# Patient Record
Sex: FEMALE | Race: BLACK OR AFRICAN AMERICAN | NOT HISPANIC OR LATINO | Employment: UNEMPLOYED | URBAN - METROPOLITAN AREA
[De-identification: names, ages, dates, MRNs, and addresses within clinical notes are randomized per-mention and may not be internally consistent; named-entity substitution may affect disease eponyms.]

---

## 2023-08-01 LAB
CHLAMYDIA TRACH., AMPLIFIED: NEGATIVE
N. GONORRHEA, AMPLIFIED: NEGATIVE
TRICHOMONAS VAGINALIS: NEGATIVE
URINE CULTURE: NORMAL

## 2023-10-05 PROBLEM — Z34.90 PREGNANCY (HHS-HCC): Status: ACTIVE | Noted: 2023-10-05

## 2023-10-05 PROBLEM — N92.6 MISSED MENSES: Status: ACTIVE | Noted: 2023-10-05

## 2023-10-05 RX ORDER — ONDANSETRON 4 MG/1
TABLET, FILM COATED ORAL
Status: ON HOLD | COMMUNITY
End: 2023-10-26 | Stop reason: ALTCHOICE

## 2023-10-05 RX ORDER — ALBUTEROL SULFATE 90 UG/1
AEROSOL, METERED RESPIRATORY (INHALATION)
COMMUNITY

## 2023-10-05 RX ORDER — LURASIDONE HYDROCHLORIDE 20 MG/1
TABLET, FILM COATED ORAL
Status: ON HOLD | COMMUNITY
End: 2023-10-26 | Stop reason: ALTCHOICE

## 2023-10-05 RX ORDER — DOCUSATE SODIUM 100 MG/1
100 CAPSULE, LIQUID FILLED ORAL NIGHTLY PRN
Status: ON HOLD | COMMUNITY
End: 2023-10-26 | Stop reason: ALTCHOICE

## 2023-10-25 ENCOUNTER — APPOINTMENT (OUTPATIENT)
Dept: OBSTETRICS AND GYNECOLOGY | Facility: HOSPITAL | Age: 28
End: 2023-10-25
Payer: COMMERCIAL

## 2023-10-26 ENCOUNTER — ANESTHESIA (OUTPATIENT)
Dept: OBSTETRICS AND GYNECOLOGY | Facility: HOSPITAL | Age: 28
End: 2023-10-26
Payer: COMMERCIAL

## 2023-10-26 ENCOUNTER — ANESTHESIA (OUTPATIENT)
Dept: ANESTHESIOLOGY | Facility: HOSPITAL | Age: 28
End: 2023-10-26
Payer: COMMERCIAL

## 2023-10-26 ENCOUNTER — HOSPITAL ENCOUNTER (INPATIENT)
Facility: HOSPITAL | Age: 28
LOS: 4 days | Discharge: HOME | End: 2023-10-30
Attending: STUDENT IN AN ORGANIZED HEALTH CARE EDUCATION/TRAINING PROGRAM | Admitting: STUDENT IN AN ORGANIZED HEALTH CARE EDUCATION/TRAINING PROGRAM
Payer: COMMERCIAL

## 2023-10-26 ENCOUNTER — ANESTHESIA EVENT (OUTPATIENT)
Dept: OBSTETRICS AND GYNECOLOGY | Facility: HOSPITAL | Age: 28
End: 2023-10-26
Payer: COMMERCIAL

## 2023-10-26 DIAGNOSIS — O14.13 SEVERE PRE-ECLAMPSIA IN THIRD TRIMESTER (HHS-HCC): Primary | ICD-10-CM

## 2023-10-26 PROBLEM — O16.3 ELEVATED BLOOD PRESSURE AFFECTING PREGNANCY IN THIRD TRIMESTER, ANTEPARTUM (HHS-HCC): Status: ACTIVE | Noted: 2023-10-26

## 2023-10-26 LAB
ABO GROUP (TYPE) IN BLOOD: NORMAL
ABO GROUP (TYPE) IN BLOOD: NORMAL
ALBUMIN SERPL BCP-MCNC: 2.9 G/DL (ref 3.4–5)
ALBUMIN SERPL BCP-MCNC: 2.9 G/DL (ref 3.4–5)
ALBUMIN SERPL BCP-MCNC: 3.3 G/DL (ref 3.4–5)
ALBUMIN SERPL BCP-MCNC: 3.3 G/DL (ref 3.4–5)
ALP SERPL-CCNC: 122 U/L (ref 33–110)
ALP SERPL-CCNC: 131 U/L (ref 33–110)
ALP SERPL-CCNC: 142 U/L (ref 33–110)
ALP SERPL-CCNC: 156 U/L (ref 33–110)
ALT SERPL W P-5'-P-CCNC: 109 U/L (ref 7–45)
ALT SERPL W P-5'-P-CCNC: 120 U/L (ref 7–45)
ALT SERPL W P-5'-P-CCNC: 124 U/L (ref 7–45)
ALT SERPL W P-5'-P-CCNC: 145 U/L (ref 7–45)
ANION GAP SERPL CALC-SCNC: 16 MMOL/L (ref 10–20)
ANION GAP SERPL CALC-SCNC: 18 MMOL/L (ref 10–20)
ANION GAP SERPL CALC-SCNC: 18 MMOL/L (ref 10–20)
ANION GAP SERPL CALC-SCNC: 20 MMOL/L (ref 10–20)
ANTIBODY SCREEN: NORMAL
ANTIBODY SCREEN: NORMAL
APTT PPP: 26 SECONDS (ref 27–38)
AST SERPL W P-5'-P-CCNC: 192 U/L (ref 9–39)
AST SERPL W P-5'-P-CCNC: 209 U/L (ref 9–39)
AST SERPL W P-5'-P-CCNC: 279 U/L (ref 9–39)
AST SERPL W P-5'-P-CCNC: 302 U/L (ref 9–39)
BILIRUB SERPL-MCNC: 0.7 MG/DL (ref 0–1.2)
BILIRUB SERPL-MCNC: 1.1 MG/DL (ref 0–1.2)
BILIRUB SERPL-MCNC: 1.2 MG/DL (ref 0–1.2)
BILIRUB SERPL-MCNC: 1.2 MG/DL (ref 0–1.2)
BUN SERPL-MCNC: 17 MG/DL (ref 6–23)
BUN SERPL-MCNC: 18 MG/DL (ref 6–23)
BUN SERPL-MCNC: 19 MG/DL (ref 6–23)
BUN SERPL-MCNC: 21 MG/DL (ref 6–23)
CALCIUM SERPL-MCNC: 8.2 MG/DL (ref 8.6–10.6)
CALCIUM SERPL-MCNC: 8.4 MG/DL (ref 8.6–10.6)
CALCIUM SERPL-MCNC: 9.2 MG/DL (ref 8.6–10.6)
CALCIUM SERPL-MCNC: 9.3 MG/DL (ref 8.6–10.6)
CHLORIDE SERPL-SCNC: 103 MMOL/L (ref 98–107)
CHLORIDE SERPL-SCNC: 103 MMOL/L (ref 98–107)
CHLORIDE SERPL-SCNC: 104 MMOL/L (ref 98–107)
CHLORIDE SERPL-SCNC: 105 MMOL/L (ref 98–107)
CO2 SERPL-SCNC: 17 MMOL/L (ref 21–32)
CO2 SERPL-SCNC: 18 MMOL/L (ref 21–32)
CO2 SERPL-SCNC: 18 MMOL/L (ref 21–32)
CO2 SERPL-SCNC: 21 MMOL/L (ref 21–32)
CREAT SERPL-MCNC: 1 MG/DL (ref 0.5–1.05)
CREAT SERPL-MCNC: 1.03 MG/DL (ref 0.5–1.05)
CREAT SERPL-MCNC: 1.1 MG/DL (ref 0.5–1.05)
CREAT SERPL-MCNC: 1.15 MG/DL (ref 0.5–1.05)
ERYTHROCYTE [DISTWIDTH] IN BLOOD BY AUTOMATED COUNT: 12 % (ref 11.5–14.5)
ERYTHROCYTE [DISTWIDTH] IN BLOOD BY AUTOMATED COUNT: 12.4 % (ref 11.5–14.5)
ERYTHROCYTE [DISTWIDTH] IN BLOOD BY AUTOMATED COUNT: 12.5 % (ref 11.5–14.5)
ERYTHROCYTE [DISTWIDTH] IN BLOOD BY AUTOMATED COUNT: 12.6 % (ref 11.5–14.5)
FIBRINOGEN PPP-MCNC: 531 MG/DL (ref 200–400)
GFR SERPL CREATININE-BSD FRML MDRD: 67 ML/MIN/1.73M*2
GFR SERPL CREATININE-BSD FRML MDRD: 71 ML/MIN/1.73M*2
GFR SERPL CREATININE-BSD FRML MDRD: 77 ML/MIN/1.73M*2
GFR SERPL CREATININE-BSD FRML MDRD: 79 ML/MIN/1.73M*2
GLUCOSE SERPL-MCNC: 129 MG/DL (ref 74–99)
GLUCOSE SERPL-MCNC: 156 MG/DL (ref 74–99)
GLUCOSE SERPL-MCNC: 83 MG/DL (ref 74–99)
GLUCOSE SERPL-MCNC: 90 MG/DL (ref 74–99)
HBV SURFACE AG SERPL QL IA: NONREACTIVE
HCT VFR BLD AUTO: 37.6 % (ref 36–46)
HCT VFR BLD AUTO: 39 % (ref 36–46)
HCT VFR BLD AUTO: 39.4 % (ref 36–46)
HCT VFR BLD AUTO: 42.9 % (ref 36–46)
HCV AB SER QL: NONREACTIVE
HGB BLD-MCNC: 12.4 G/DL (ref 12–16)
HGB BLD-MCNC: 12.5 G/DL (ref 12–16)
HGB BLD-MCNC: 13.1 G/DL (ref 12–16)
HGB BLD-MCNC: 14.5 G/DL (ref 12–16)
INR PPP: 0.9 (ref 0.9–1.1)
LDH SERPL L TO P-CCNC: 555 U/L (ref 84–246)
MCH RBC QN AUTO: 29.1 PG (ref 26–34)
MCH RBC QN AUTO: 29.3 PG (ref 26–34)
MCH RBC QN AUTO: 29.7 PG (ref 26–34)
MCH RBC QN AUTO: 29.8 PG (ref 26–34)
MCHC RBC AUTO-ENTMCNC: 31.8 G/DL (ref 32–36)
MCHC RBC AUTO-ENTMCNC: 33.2 G/DL (ref 32–36)
MCHC RBC AUTO-ENTMCNC: 33.2 G/DL (ref 32–36)
MCHC RBC AUTO-ENTMCNC: 33.8 G/DL (ref 32–36)
MCV RBC AUTO: 88 FL (ref 80–100)
MCV RBC AUTO: 94 FL (ref 80–100)
NRBC BLD-RTO: 0 /100 WBCS (ref 0–0)
PLATELET # BLD AUTO: 109 X10*3/UL (ref 150–450)
PLATELET # BLD AUTO: 118 X10*3/UL (ref 150–450)
PLATELET # BLD AUTO: 205 X10*3/UL (ref 150–450)
PLATELET # BLD AUTO: 91 X10*3/UL (ref 150–450)
PMV BLD AUTO: 10.7 FL (ref 7.5–11.5)
PMV BLD AUTO: 10.9 FL (ref 7.5–11.5)
PMV BLD AUTO: 11 FL (ref 7.5–11.5)
PMV BLD AUTO: 11.4 FL (ref 7.5–11.5)
POTASSIUM SERPL-SCNC: 4.4 MMOL/L (ref 3.5–5.3)
POTASSIUM SERPL-SCNC: 4.5 MMOL/L (ref 3.5–5.3)
POTASSIUM SERPL-SCNC: 4.7 MMOL/L (ref 3.5–5.3)
POTASSIUM SERPL-SCNC: 5 MMOL/L (ref 3.5–5.3)
PROT SERPL-MCNC: 5.7 G/DL (ref 6.4–8.2)
PROT SERPL-MCNC: 5.8 G/DL (ref 6.4–8.2)
PROT SERPL-MCNC: 6.4 G/DL (ref 6.4–8.2)
PROT SERPL-MCNC: 6.5 G/DL (ref 6.4–8.2)
PROTHROMBIN TIME: 9.7 SECONDS (ref 9.8–12.8)
RBC # BLD AUTO: 4.17 X10*6/UL (ref 4–5.2)
RBC # BLD AUTO: 4.29 X10*6/UL (ref 4–5.2)
RBC # BLD AUTO: 4.47 X10*6/UL (ref 4–5.2)
RBC # BLD AUTO: 4.86 X10*6/UL (ref 4–5.2)
RH FACTOR (ANTIGEN D): NORMAL
RH FACTOR (ANTIGEN D): NORMAL
RUBV IGG SERPL IA-ACNC: 2.3 IA
RUBV IGG SERPL QL IA: POSITIVE
SODIUM SERPL-SCNC: 132 MMOL/L (ref 136–145)
SODIUM SERPL-SCNC: 134 MMOL/L (ref 136–145)
SODIUM SERPL-SCNC: 137 MMOL/L (ref 136–145)
SODIUM SERPL-SCNC: 139 MMOL/L (ref 136–145)
T PALLIDUM AB SER QL: NONREACTIVE
WBC # BLD AUTO: 10.9 X10*3/UL (ref 4.4–11.3)
WBC # BLD AUTO: 16.5 X10*3/UL (ref 4.4–11.3)
WBC # BLD AUTO: 16.5 X10*3/UL (ref 4.4–11.3)
WBC # BLD AUTO: 18.5 X10*3/UL (ref 4.4–11.3)

## 2023-10-26 PROCEDURE — 31500 INSERT EMERGENCY AIRWAY: CPT | Performed by: STUDENT IN AN ORGANIZED HEALTH CARE EDUCATION/TRAINING PROGRAM

## 2023-10-26 PROCEDURE — 1100000001 HC PRIVATE ROOM DAILY

## 2023-10-26 PROCEDURE — 36415 COLL VENOUS BLD VENIPUNCTURE: CPT

## 2023-10-26 PROCEDURE — 2500000001 HC RX 250 WO HCPCS SELF ADMINISTERED DRUGS (ALT 637 FOR MEDICARE OP)

## 2023-10-26 PROCEDURE — 3700000014 HC AN EPIDURAL BLOCK CHARGE: Performed by: STUDENT IN AN ORGANIZED HEALTH CARE EDUCATION/TRAINING PROGRAM

## 2023-10-26 PROCEDURE — 86803 HEPATITIS C AB TEST: CPT | Performed by: STUDENT IN AN ORGANIZED HEALTH CARE EDUCATION/TRAINING PROGRAM

## 2023-10-26 PROCEDURE — 2500000004 HC RX 250 GENERAL PHARMACY W/ HCPCS (ALT 636 FOR OP/ED): Performed by: STUDENT IN AN ORGANIZED HEALTH CARE EDUCATION/TRAINING PROGRAM

## 2023-10-26 PROCEDURE — 86920 COMPATIBILITY TEST SPIN: CPT

## 2023-10-26 PROCEDURE — 86780 TREPONEMA PALLIDUM: CPT | Performed by: STUDENT IN AN ORGANIZED HEALTH CARE EDUCATION/TRAINING PROGRAM

## 2023-10-26 PROCEDURE — 86900 BLOOD TYPING SEROLOGIC ABO: CPT

## 2023-10-26 PROCEDURE — 7210000002 HC LABOR PER HOUR

## 2023-10-26 PROCEDURE — 96372 THER/PROPH/DIAG INJ SC/IM: CPT | Performed by: STUDENT IN AN ORGANIZED HEALTH CARE EDUCATION/TRAINING PROGRAM

## 2023-10-26 PROCEDURE — 85384 FIBRINOGEN ACTIVITY: CPT | Performed by: STUDENT IN AN ORGANIZED HEALTH CARE EDUCATION/TRAINING PROGRAM

## 2023-10-26 PROCEDURE — 36415 COLL VENOUS BLD VENIPUNCTURE: CPT | Performed by: STUDENT IN AN ORGANIZED HEALTH CARE EDUCATION/TRAINING PROGRAM

## 2023-10-26 PROCEDURE — 59514 CESAREAN DELIVERY ONLY: CPT | Performed by: STUDENT IN AN ORGANIZED HEALTH CARE EDUCATION/TRAINING PROGRAM

## 2023-10-26 PROCEDURE — 83615 LACTATE (LD) (LDH) ENZYME: CPT | Performed by: STUDENT IN AN ORGANIZED HEALTH CARE EDUCATION/TRAINING PROGRAM

## 2023-10-26 PROCEDURE — 86900 BLOOD TYPING SEROLOGIC ABO: CPT | Performed by: STUDENT IN AN ORGANIZED HEALTH CARE EDUCATION/TRAINING PROGRAM

## 2023-10-26 PROCEDURE — 84075 ASSAY ALKALINE PHOSPHATASE: CPT | Performed by: STUDENT IN AN ORGANIZED HEALTH CARE EDUCATION/TRAINING PROGRAM

## 2023-10-26 PROCEDURE — 7100000016 HC LABOR RECOVERY PER HOUR

## 2023-10-26 PROCEDURE — 2500000005 HC RX 250 GENERAL PHARMACY W/O HCPCS

## 2023-10-26 PROCEDURE — 88305 TISSUE EXAM BY PATHOLOGIST: CPT | Mod: TC,SUR | Performed by: STUDENT IN AN ORGANIZED HEALTH CARE EDUCATION/TRAINING PROGRAM

## 2023-10-26 PROCEDURE — 80053 COMPREHEN METABOLIC PANEL: CPT | Performed by: STUDENT IN AN ORGANIZED HEALTH CARE EDUCATION/TRAINING PROGRAM

## 2023-10-26 PROCEDURE — 88305 TISSUE EXAM BY PATHOLOGIST: CPT | Performed by: PATHOLOGY

## 2023-10-26 PROCEDURE — 85027 COMPLETE CBC AUTOMATED: CPT | Performed by: STUDENT IN AN ORGANIZED HEALTH CARE EDUCATION/TRAINING PROGRAM

## 2023-10-26 PROCEDURE — 85610 PROTHROMBIN TIME: CPT | Performed by: STUDENT IN AN ORGANIZED HEALTH CARE EDUCATION/TRAINING PROGRAM

## 2023-10-26 PROCEDURE — 86317 IMMUNOASSAY INFECTIOUS AGENT: CPT | Performed by: STUDENT IN AN ORGANIZED HEALTH CARE EDUCATION/TRAINING PROGRAM

## 2023-10-26 PROCEDURE — 99199 UNLISTED SPECIAL SVC PX/RPRT: CPT

## 2023-10-26 PROCEDURE — 87389 HIV-1 AG W/HIV-1&-2 AB AG IA: CPT | Performed by: STUDENT IN AN ORGANIZED HEALTH CARE EDUCATION/TRAINING PROGRAM

## 2023-10-26 PROCEDURE — 3700000018 HC OB ANESTHESIA C-SECTION: Performed by: STUDENT IN AN ORGANIZED HEALTH CARE EDUCATION/TRAINING PROGRAM

## 2023-10-26 PROCEDURE — 01961 ANES CESAREAN DELIVERY ONLY: CPT | Performed by: STUDENT IN AN ORGANIZED HEALTH CARE EDUCATION/TRAINING PROGRAM

## 2023-10-26 PROCEDURE — 7100000016 HC LABOR RECOVERY PER HOUR: Performed by: STUDENT IN AN ORGANIZED HEALTH CARE EDUCATION/TRAINING PROGRAM

## 2023-10-26 PROCEDURE — 01961 ANES CESAREAN DELIVERY ONLY: CPT

## 2023-10-26 PROCEDURE — 2500000004 HC RX 250 GENERAL PHARMACY W/ HCPCS (ALT 636 FOR OP/ED)

## 2023-10-26 PROCEDURE — 87340 HEPATITIS B SURFACE AG IA: CPT | Performed by: STUDENT IN AN ORGANIZED HEALTH CARE EDUCATION/TRAINING PROGRAM

## 2023-10-26 RX ORDER — HYDROMORPHONE HYDROCHLORIDE 1 MG/ML
0.2 INJECTION, SOLUTION INTRAMUSCULAR; INTRAVENOUS; SUBCUTANEOUS EVERY 5 MIN PRN
Status: DISCONTINUED | OUTPATIENT
Start: 2023-10-26 | End: 2023-10-31 | Stop reason: HOSPADM

## 2023-10-26 RX ORDER — TRANEXAMIC ACID 100 MG/ML
1000 INJECTION, SOLUTION INTRAVENOUS ONCE AS NEEDED
Status: DISCONTINUED | OUTPATIENT
Start: 2023-10-26 | End: 2023-10-31 | Stop reason: HOSPADM

## 2023-10-26 RX ORDER — CEFAZOLIN SODIUM 2 G/50ML
SOLUTION INTRAVENOUS AS NEEDED
Status: DISCONTINUED | OUTPATIENT
Start: 2023-10-26 | End: 2023-10-26

## 2023-10-26 RX ORDER — BETAMETHASONE SODIUM PHOSPHATE AND BETAMETHASONE ACETATE 3; 3 MG/ML; MG/ML
12 INJECTION, SUSPENSION INTRA-ARTICULAR; INTRALESIONAL; INTRAMUSCULAR; SOFT TISSUE EVERY 24 HOURS
Status: DISCONTINUED | OUTPATIENT
Start: 2023-10-26 | End: 2023-10-26

## 2023-10-26 RX ORDER — DIPHENHYDRAMINE HYDROCHLORIDE 50 MG/ML
25 INJECTION INTRAMUSCULAR; INTRAVENOUS EVERY 4 HOURS PRN
Status: DISCONTINUED | OUTPATIENT
Start: 2023-10-26 | End: 2023-10-31 | Stop reason: HOSPADM

## 2023-10-26 RX ORDER — HYDRALAZINE HYDROCHLORIDE 20 MG/ML
10 INJECTION INTRAMUSCULAR; INTRAVENOUS ONCE
Status: COMPLETED | OUTPATIENT
Start: 2023-10-26 | End: 2023-10-26

## 2023-10-26 RX ORDER — ONDANSETRON 4 MG/1
4 TABLET, FILM COATED ORAL EVERY 6 HOURS PRN
Status: DISCONTINUED | OUTPATIENT
Start: 2023-10-26 | End: 2023-10-31 | Stop reason: HOSPADM

## 2023-10-26 RX ORDER — LIDOCAINE HYDROCHLORIDE 20 MG/ML
INJECTION, SOLUTION EPIDURAL; INFILTRATION; INTRACAUDAL; PERINEURAL AS NEEDED
Status: DISCONTINUED | OUTPATIENT
Start: 2023-10-26 | End: 2023-10-26

## 2023-10-26 RX ORDER — NALBUPHINE HYDROCHLORIDE 10 MG/ML
5 INJECTION, SOLUTION INTRAMUSCULAR; INTRAVENOUS; SUBCUTANEOUS
Status: DISCONTINUED | OUTPATIENT
Start: 2023-10-26 | End: 2023-10-27

## 2023-10-26 RX ORDER — HYDRALAZINE HYDROCHLORIDE 20 MG/ML
INJECTION INTRAMUSCULAR; INTRAVENOUS
Status: COMPLETED
Start: 2023-10-26 | End: 2023-10-26

## 2023-10-26 RX ORDER — ONDANSETRON 4 MG/1
4 TABLET, FILM COATED ORAL EVERY 6 HOURS PRN
Status: DISCONTINUED | OUTPATIENT
Start: 2023-10-26 | End: 2023-10-26

## 2023-10-26 RX ORDER — OXYTOCIN/0.9 % SODIUM CHLORIDE 30/500 ML
60 PLASTIC BAG, INJECTION (ML) INTRAVENOUS ONCE AS NEEDED
Status: DISCONTINUED | OUTPATIENT
Start: 2023-10-26 | End: 2023-10-31 | Stop reason: HOSPADM

## 2023-10-26 RX ORDER — FAMOTIDINE 10 MG/ML
INJECTION INTRAVENOUS AS NEEDED
Status: DISCONTINUED | OUTPATIENT
Start: 2023-10-26 | End: 2023-10-26

## 2023-10-26 RX ORDER — OXYTOCIN/0.9 % SODIUM CHLORIDE 30/500 ML
60 PLASTIC BAG, INJECTION (ML) INTRAVENOUS ONCE AS NEEDED
Status: DISCONTINUED | OUTPATIENT
Start: 2023-10-26 | End: 2023-10-26

## 2023-10-26 RX ORDER — PROPOFOL 10 MG/ML
INJECTION, EMULSION INTRAVENOUS AS NEEDED
Status: DISCONTINUED | OUTPATIENT
Start: 2023-10-26 | End: 2023-10-26

## 2023-10-26 RX ORDER — DIPHENHYDRAMINE HCL 25 MG
25 CAPSULE ORAL EVERY 4 HOURS PRN
Status: DISCONTINUED | OUTPATIENT
Start: 2023-10-26 | End: 2023-10-31 | Stop reason: HOSPADM

## 2023-10-26 RX ORDER — NALOXONE HYDROCHLORIDE 0.4 MG/ML
0.1 INJECTION, SOLUTION INTRAMUSCULAR; INTRAVENOUS; SUBCUTANEOUS EVERY 5 MIN PRN
Status: DISCONTINUED | OUTPATIENT
Start: 2023-10-26 | End: 2023-10-31 | Stop reason: HOSPADM

## 2023-10-26 RX ORDER — LIDOCAINE HYDROCHLORIDE 10 MG/ML
30 INJECTION INFILTRATION; PERINEURAL ONCE AS NEEDED
Status: DISCONTINUED | OUTPATIENT
Start: 2023-10-26 | End: 2023-10-26

## 2023-10-26 RX ORDER — KETOROLAC TROMETHAMINE 30 MG/ML
INJECTION, SOLUTION INTRAMUSCULAR; INTRAVENOUS AS NEEDED
Status: DISCONTINUED | OUTPATIENT
Start: 2023-10-26 | End: 2023-10-26

## 2023-10-26 RX ORDER — METOCLOPRAMIDE 10 MG/1
10 TABLET ORAL EVERY 6 HOURS PRN
Status: DISCONTINUED | OUTPATIENT
Start: 2023-10-26 | End: 2023-10-26

## 2023-10-26 RX ORDER — NIFEDIPINE 30 MG/1
30 TABLET, FILM COATED, EXTENDED RELEASE ORAL
Status: DISCONTINUED | OUTPATIENT
Start: 2023-10-26 | End: 2023-10-26

## 2023-10-26 RX ORDER — ACETAMINOPHEN 325 MG/1
975 TABLET ORAL EVERY 6 HOURS
Status: DISCONTINUED | OUTPATIENT
Start: 2023-10-26 | End: 2023-10-31 | Stop reason: HOSPADM

## 2023-10-26 RX ORDER — LABETALOL HYDROCHLORIDE 5 MG/ML
80 INJECTION, SOLUTION INTRAVENOUS ONCE
Status: COMPLETED | OUTPATIENT
Start: 2023-10-26 | End: 2023-10-26

## 2023-10-26 RX ORDER — MIDAZOLAM HYDROCHLORIDE 1 MG/ML
INJECTION, SOLUTION INTRAMUSCULAR; INTRAVENOUS AS NEEDED
Status: DISCONTINUED | OUTPATIENT
Start: 2023-10-26 | End: 2023-10-26

## 2023-10-26 RX ORDER — MISOPROSTOL 200 UG/1
800 TABLET ORAL ONCE AS NEEDED
Status: DISCONTINUED | OUTPATIENT
Start: 2023-10-26 | End: 2023-10-31 | Stop reason: HOSPADM

## 2023-10-26 RX ORDER — NIFEDIPINE 60 MG/1
120 TABLET, FILM COATED, EXTENDED RELEASE ORAL
Status: DISCONTINUED | OUTPATIENT
Start: 2023-10-27 | End: 2023-10-26

## 2023-10-26 RX ORDER — LABETALOL HYDROCHLORIDE 5 MG/ML
20 INJECTION, SOLUTION INTRAVENOUS ONCE AS NEEDED
Status: DISCONTINUED | OUTPATIENT
Start: 2023-10-26 | End: 2023-10-31 | Stop reason: HOSPADM

## 2023-10-26 RX ORDER — MAGNESIUM SULFATE HEPTAHYDRATE 40 MG/ML
1 INJECTION, SOLUTION INTRAVENOUS CONTINUOUS
Status: DISCONTINUED | OUTPATIENT
Start: 2023-10-26 | End: 2023-10-27

## 2023-10-26 RX ORDER — LABETALOL HYDROCHLORIDE 5 MG/ML
40 INJECTION, SOLUTION INTRAVENOUS ONCE
Status: COMPLETED | OUTPATIENT
Start: 2023-10-26 | End: 2023-10-26

## 2023-10-26 RX ORDER — NIFEDIPINE 60 MG/1
60 TABLET, FILM COATED, EXTENDED RELEASE ORAL
Status: CANCELLED | OUTPATIENT
Start: 2023-10-26

## 2023-10-26 RX ORDER — CARBOPROST TROMETHAMINE 250 UG/ML
250 INJECTION, SOLUTION INTRAMUSCULAR ONCE AS NEEDED
Status: DISCONTINUED | OUTPATIENT
Start: 2023-10-26 | End: 2023-10-26

## 2023-10-26 RX ORDER — MAGNESIUM SULFATE HEPTAHYDRATE 40 MG/ML
2 INJECTION, SOLUTION INTRAVENOUS CONTINUOUS
Status: DISCONTINUED | OUTPATIENT
Start: 2023-10-26 | End: 2023-10-26

## 2023-10-26 RX ORDER — NIFEDIPINE 10 MG/1
20 CAPSULE ORAL ONCE AS NEEDED
Status: DISCONTINUED | OUTPATIENT
Start: 2023-10-26 | End: 2023-10-27

## 2023-10-26 RX ORDER — BISACODYL 10 MG/1
10 SUPPOSITORY RECTAL DAILY PRN
Status: DISCONTINUED | OUTPATIENT
Start: 2023-10-26 | End: 2023-10-31 | Stop reason: HOSPADM

## 2023-10-26 RX ORDER — ADHESIVE BANDAGE
10 BANDAGE TOPICAL
Status: DISCONTINUED | OUTPATIENT
Start: 2023-10-26 | End: 2023-10-31 | Stop reason: HOSPADM

## 2023-10-26 RX ORDER — TRANEXAMIC ACID 100 MG/ML
1000 INJECTION, SOLUTION INTRAVENOUS ONCE AS NEEDED
Status: DISCONTINUED | OUTPATIENT
Start: 2023-10-26 | End: 2023-10-26

## 2023-10-26 RX ORDER — OXYTOCIN 10 [USP'U]/ML
10 INJECTION, SOLUTION INTRAMUSCULAR; INTRAVENOUS ONCE AS NEEDED
Status: DISCONTINUED | OUTPATIENT
Start: 2023-10-26 | End: 2023-10-31 | Stop reason: HOSPADM

## 2023-10-26 RX ORDER — OXYCODONE HYDROCHLORIDE 5 MG/1
5 TABLET ORAL EVERY 4 HOURS PRN
Status: DISCONTINUED | OUTPATIENT
Start: 2023-10-27 | End: 2023-10-31 | Stop reason: HOSPADM

## 2023-10-26 RX ORDER — NIFEDIPINE 90 MG/1
90 TABLET, EXTENDED RELEASE ORAL
Status: DISCONTINUED | OUTPATIENT
Start: 2023-10-27 | End: 2023-10-26

## 2023-10-26 RX ORDER — METHYLERGONOVINE MALEATE 0.2 MG/ML
0.2 INJECTION INTRAVENOUS ONCE AS NEEDED
Status: DISCONTINUED | OUTPATIENT
Start: 2023-10-26 | End: 2023-10-26

## 2023-10-26 RX ORDER — NIFEDIPINE 10 MG/1
CAPSULE ORAL
Status: DISPENSED
Start: 2023-10-26 | End: 2023-10-26

## 2023-10-26 RX ORDER — NIFEDIPINE 30 MG/1
30 TABLET, FILM COATED, EXTENDED RELEASE ORAL ONCE
Status: DISCONTINUED | OUTPATIENT
Start: 2023-10-26 | End: 2023-10-26

## 2023-10-26 RX ORDER — NIFEDIPINE 10 MG/1
10 CAPSULE ORAL ONCE AS NEEDED
Status: DISCONTINUED | OUTPATIENT
Start: 2023-10-26 | End: 2023-10-26

## 2023-10-26 RX ORDER — NIFEDIPINE 60 MG/1
60 TABLET, FILM COATED, EXTENDED RELEASE ORAL ONCE
Status: COMPLETED | OUTPATIENT
Start: 2023-10-26 | End: 2023-10-26

## 2023-10-26 RX ORDER — METOCLOPRAMIDE HYDROCHLORIDE 5 MG/ML
10 INJECTION INTRAMUSCULAR; INTRAVENOUS EVERY 6 HOURS PRN
Status: DISCONTINUED | OUTPATIENT
Start: 2023-10-26 | End: 2023-10-26

## 2023-10-26 RX ORDER — ONDANSETRON HYDROCHLORIDE 2 MG/ML
4 INJECTION, SOLUTION INTRAVENOUS EVERY 6 HOURS PRN
Status: DISCONTINUED | OUTPATIENT
Start: 2023-10-26 | End: 2023-10-31 | Stop reason: HOSPADM

## 2023-10-26 RX ORDER — MORPHINE SULFATE 0.5 MG/ML
INJECTION, SOLUTION EPIDURAL; INTRATHECAL; INTRAVENOUS AS NEEDED
Status: DISCONTINUED | OUTPATIENT
Start: 2023-10-26 | End: 2023-10-26

## 2023-10-26 RX ORDER — NIFEDIPINE 30 MG/1
30 TABLET, FILM COATED, EXTENDED RELEASE ORAL ONCE
Status: COMPLETED | OUTPATIENT
Start: 2023-10-26 | End: 2023-10-26

## 2023-10-26 RX ORDER — NIFEDIPINE 10 MG/1
10 CAPSULE ORAL ONCE AS NEEDED
Status: DISCONTINUED | OUTPATIENT
Start: 2023-10-26 | End: 2023-10-31 | Stop reason: HOSPADM

## 2023-10-26 RX ORDER — ONDANSETRON HYDROCHLORIDE 2 MG/ML
4 INJECTION, SOLUTION INTRAVENOUS ONCE
Status: COMPLETED | OUTPATIENT
Start: 2023-10-26 | End: 2023-10-26

## 2023-10-26 RX ORDER — INSULIN LISPRO 100 [IU]/ML
0-10 INJECTION, SOLUTION INTRAVENOUS; SUBCUTANEOUS
Status: DISCONTINUED | OUTPATIENT
Start: 2023-10-26 | End: 2023-10-26

## 2023-10-26 RX ORDER — CARBOPROST TROMETHAMINE 250 UG/ML
250 INJECTION, SOLUTION INTRAMUSCULAR ONCE AS NEEDED
Status: DISCONTINUED | OUTPATIENT
Start: 2023-10-26 | End: 2023-10-31 | Stop reason: HOSPADM

## 2023-10-26 RX ORDER — LOPERAMIDE HYDROCHLORIDE 2 MG/1
4 CAPSULE ORAL EVERY 2 HOUR PRN
Status: DISCONTINUED | OUTPATIENT
Start: 2023-10-26 | End: 2023-10-31 | Stop reason: HOSPADM

## 2023-10-26 RX ORDER — SIMETHICONE 80 MG
80 TABLET,CHEWABLE ORAL 4 TIMES DAILY PRN
Status: DISCONTINUED | OUTPATIENT
Start: 2023-10-26 | End: 2023-10-31 | Stop reason: HOSPADM

## 2023-10-26 RX ORDER — OXYTOCIN 10 [USP'U]/ML
10 INJECTION, SOLUTION INTRAMUSCULAR; INTRAVENOUS ONCE AS NEEDED
Status: DISCONTINUED | OUTPATIENT
Start: 2023-10-26 | End: 2023-10-26

## 2023-10-26 RX ORDER — OXYCODONE HYDROCHLORIDE 5 MG/1
10 TABLET ORAL EVERY 4 HOURS PRN
Status: DISCONTINUED | OUTPATIENT
Start: 2023-10-27 | End: 2023-10-31 | Stop reason: HOSPADM

## 2023-10-26 RX ORDER — MAGNESIUM SULFATE HEPTAHYDRATE 40 MG/ML
INJECTION, SOLUTION INTRAVENOUS
Status: COMPLETED
Start: 2023-10-26 | End: 2023-10-26

## 2023-10-26 RX ORDER — TERBUTALINE SULFATE 1 MG/ML
0.25 INJECTION SUBCUTANEOUS ONCE AS NEEDED
Status: DISCONTINUED | OUTPATIENT
Start: 2023-10-26 | End: 2023-10-26

## 2023-10-26 RX ORDER — SODIUM CHLORIDE, SODIUM LACTATE, POTASSIUM CHLORIDE, CALCIUM CHLORIDE 600; 310; 30; 20 MG/100ML; MG/100ML; MG/100ML; MG/100ML
125 INJECTION, SOLUTION INTRAVENOUS CONTINUOUS
Status: DISCONTINUED | OUTPATIENT
Start: 2023-10-26 | End: 2023-10-26

## 2023-10-26 RX ORDER — LABETALOL HYDROCHLORIDE 5 MG/ML
20 INJECTION, SOLUTION INTRAVENOUS ONCE
Status: COMPLETED | OUTPATIENT
Start: 2023-10-26 | End: 2023-10-26

## 2023-10-26 RX ORDER — NIFEDIPINE 10 MG/1
10 CAPSULE ORAL ONCE AS NEEDED
Status: COMPLETED | OUTPATIENT
Start: 2023-10-26 | End: 2023-10-26

## 2023-10-26 RX ORDER — SUCCINYLCHOLINE CHLORIDE 20 MG/ML
INJECTION INTRAMUSCULAR; INTRAVENOUS AS NEEDED
Status: DISCONTINUED | OUTPATIENT
Start: 2023-10-26 | End: 2023-10-26

## 2023-10-26 RX ORDER — POLYETHYLENE GLYCOL 3350 17 G/17G
17 POWDER, FOR SOLUTION ORAL 2 TIMES DAILY PRN
Status: DISCONTINUED | OUTPATIENT
Start: 2023-10-26 | End: 2023-10-31 | Stop reason: HOSPADM

## 2023-10-26 RX ORDER — LABETALOL HYDROCHLORIDE 5 MG/ML
INJECTION, SOLUTION INTRAVENOUS
Status: COMPLETED
Start: 2023-10-26 | End: 2023-10-26

## 2023-10-26 RX ORDER — NIFEDIPINE 60 MG/1
60 TABLET, FILM COATED, EXTENDED RELEASE ORAL ONCE
Status: DISCONTINUED | OUTPATIENT
Start: 2023-10-26 | End: 2023-10-26

## 2023-10-26 RX ORDER — CALCIUM GLUCONATE 98 MG/ML
1 INJECTION, SOLUTION INTRAVENOUS ONCE AS NEEDED
Status: DISCONTINUED | OUTPATIENT
Start: 2023-10-26 | End: 2023-10-26

## 2023-10-26 RX ORDER — HYDRALAZINE HYDROCHLORIDE 20 MG/ML
5 INJECTION INTRAMUSCULAR; INTRAVENOUS ONCE AS NEEDED
Status: DISCONTINUED | OUTPATIENT
Start: 2023-10-26 | End: 2023-10-26

## 2023-10-26 RX ORDER — NIFEDIPINE 90 MG/1
90 TABLET, EXTENDED RELEASE ORAL
Status: DISCONTINUED | OUTPATIENT
Start: 2023-10-27 | End: 2023-10-31 | Stop reason: HOSPADM

## 2023-10-26 RX ORDER — LABETALOL HYDROCHLORIDE 5 MG/ML
20 INJECTION, SOLUTION INTRAVENOUS ONCE AS NEEDED
Status: COMPLETED | OUTPATIENT
Start: 2023-10-26 | End: 2023-10-26

## 2023-10-26 RX ORDER — ENOXAPARIN SODIUM 100 MG/ML
40 INJECTION SUBCUTANEOUS EVERY 24 HOURS
Status: DISCONTINUED | OUTPATIENT
Start: 2023-10-26 | End: 2023-10-28

## 2023-10-26 RX ORDER — MISOPROSTOL 200 UG/1
800 TABLET ORAL ONCE AS NEEDED
Status: DISCONTINUED | OUTPATIENT
Start: 2023-10-26 | End: 2023-10-26

## 2023-10-26 RX ORDER — LOPERAMIDE HYDROCHLORIDE 2 MG/1
4 CAPSULE ORAL EVERY 2 HOUR PRN
Status: DISCONTINUED | OUTPATIENT
Start: 2023-10-26 | End: 2023-10-26

## 2023-10-26 RX ORDER — HYDRALAZINE HYDROCHLORIDE 20 MG/ML
5 INJECTION INTRAMUSCULAR; INTRAVENOUS ONCE AS NEEDED
Status: COMPLETED | OUTPATIENT
Start: 2023-10-26 | End: 2023-10-26

## 2023-10-26 RX ORDER — ONDANSETRON HYDROCHLORIDE 2 MG/ML
4 INJECTION, SOLUTION INTRAVENOUS EVERY 6 HOURS PRN
Status: DISCONTINUED | OUTPATIENT
Start: 2023-10-26 | End: 2023-10-26

## 2023-10-26 RX ORDER — METHYLERGONOVINE MALEATE 0.2 MG/ML
0.2 INJECTION INTRAVENOUS ONCE AS NEEDED
Status: DISCONTINUED | OUTPATIENT
Start: 2023-10-26 | End: 2023-10-31 | Stop reason: HOSPADM

## 2023-10-26 RX ORDER — HYDRALAZINE HYDROCHLORIDE 20 MG/ML
5 INJECTION INTRAMUSCULAR; INTRAVENOUS ONCE
Status: DISCONTINUED | OUTPATIENT
Start: 2023-10-26 | End: 2023-10-26

## 2023-10-26 RX ORDER — DEXTROSE 40 %
15 GEL (GRAM) ORAL
Status: DISCONTINUED | OUTPATIENT
Start: 2023-10-26 | End: 2023-10-26

## 2023-10-26 RX ORDER — LIDOCAINE 560 MG/1
1 PATCH PERCUTANEOUS; TOPICAL; TRANSDERMAL
Status: DISCONTINUED | OUTPATIENT
Start: 2023-10-26 | End: 2023-10-31 | Stop reason: HOSPADM

## 2023-10-26 RX ORDER — ACETAMINOPHEN 120 MG/1
SUPPOSITORY RECTAL AS NEEDED
Status: DISCONTINUED | OUTPATIENT
Start: 2023-10-26 | End: 2023-10-26

## 2023-10-26 RX ORDER — LABETALOL HYDROCHLORIDE 5 MG/ML
20 INJECTION, SOLUTION INTRAVENOUS ONCE AS NEEDED
Status: DISCONTINUED | OUTPATIENT
Start: 2023-10-26 | End: 2023-10-26

## 2023-10-26 RX ADMIN — LABETALOL HYDROCHLORIDE 40 MG: 5 INJECTION INTRAVENOUS at 06:53

## 2023-10-26 RX ADMIN — MORPHINE SULFATE 1 MG: 0.5 INJECTION EPIDURAL; INTRATHECAL; INTRAVENOUS at 05:40

## 2023-10-26 RX ADMIN — FAMOTIDINE 20 MG: 10 INJECTION, SOLUTION INTRAVENOUS at 04:48

## 2023-10-26 RX ADMIN — ONDANSETRON 4 MG: 2 INJECTION INTRAMUSCULAR; INTRAVENOUS at 04:27

## 2023-10-26 RX ADMIN — LABETALOL HYDROCHLORIDE 40 MG: 5 INJECTION, SOLUTION INTRAVENOUS at 06:53

## 2023-10-26 RX ADMIN — HYDRALAZINE HYDROCHLORIDE 10 MG: 20 INJECTION INTRAMUSCULAR; INTRAVENOUS at 09:36

## 2023-10-26 RX ADMIN — LIDOCAINE HYDROCHLORIDE 100 MG: 20 INJECTION, SOLUTION EPIDURAL; INFILTRATION; INTRACAUDAL; PERINEURAL at 04:27

## 2023-10-26 RX ADMIN — CEFAZOLIN SODIUM 2 G: 2 SOLUTION INTRAVENOUS at 04:27

## 2023-10-26 RX ADMIN — MIDAZOLAM 2 MG: 1 INJECTION INTRAMUSCULAR; INTRAVENOUS at 04:27

## 2023-10-26 RX ADMIN — MAGNESIUM SULFATE HEPTAHYDRATE 1 G/HR: 40 INJECTION, SOLUTION INTRAVENOUS at 07:45

## 2023-10-26 RX ADMIN — KETOROLAC TROMETHAMINE 30 MG: 30 INJECTION, SOLUTION INTRAMUSCULAR; INTRAVENOUS at 05:35

## 2023-10-26 RX ADMIN — HYDRALAZINE HYDROCHLORIDE 5 MG: 20 INJECTION INTRAMUSCULAR; INTRAVENOUS at 02:30

## 2023-10-26 RX ADMIN — NIFEDIPINE 60 MG: 60 TABLET, EXTENDED RELEASE ORAL at 06:30

## 2023-10-26 RX ADMIN — MAGNESIUM SULFATE HEPTAHYDRATE 1 G/HR: 40 INJECTION, SOLUTION INTRAVENOUS at 16:27

## 2023-10-26 RX ADMIN — LABETALOL HYDROCHLORIDE 20 MG: 5 INJECTION, SOLUTION INTRAVENOUS at 06:31

## 2023-10-26 RX ADMIN — LABETALOL HYDROCHLORIDE 20 MG: 5 INJECTION, SOLUTION INTRAVENOUS at 06:25

## 2023-10-26 RX ADMIN — ACETAMINOPHEN 975 MG: 325 TABLET ORAL at 17:37

## 2023-10-26 RX ADMIN — MORPHINE SULFATE 1 MG: 0.5 INJECTION EPIDURAL; INTRATHECAL; INTRAVENOUS at 04:52

## 2023-10-26 RX ADMIN — ACETAMINOPHEN 650 MG: 120 SUPPOSITORY RECTAL at 05:35

## 2023-10-26 RX ADMIN — HYDRALAZINE HYDROCHLORIDE 10 MG: 20 INJECTION INTRAMUSCULAR; INTRAVENOUS at 08:16

## 2023-10-26 RX ADMIN — PROPOFOL 200 MG: 10 INJECTION, EMULSION INTRAVENOUS at 04:27

## 2023-10-26 RX ADMIN — MORPHINE SULFATE 0.15 MG: 0.5 INJECTION, SOLUTION EPIDURAL; INTRATHECAL; INTRAVENOUS at 05:25

## 2023-10-26 RX ADMIN — OXYTOCIN 600 MILLI-UNITS/MIN: 10 INJECTION, SOLUTION INTRAMUSCULAR; INTRAVENOUS at 04:31

## 2023-10-26 RX ADMIN — BETAMETHASONE ACETATE AND BETAMETHASONE SODIUM PHOSPHATE 12 MG: 3; 3 INJECTION, SUSPENSION INTRA-ARTICULAR; INTRALESIONAL; INTRAMUSCULAR; SOFT TISSUE at 02:38

## 2023-10-26 RX ADMIN — NIFEDIPINE 10 MG: 10 CAPSULE ORAL at 02:17

## 2023-10-26 RX ADMIN — NIFEDIPINE 30 MG: 30 TABLET, FILM COATED, EXTENDED RELEASE ORAL at 08:03

## 2023-10-26 RX ADMIN — LABETALOL HYDROCHLORIDE 80 MG: 5 INJECTION INTRAVENOUS at 07:36

## 2023-10-26 RX ADMIN — ONDANSETRON 4 MG: 2 INJECTION INTRAMUSCULAR; INTRAVENOUS at 11:46

## 2023-10-26 RX ADMIN — SUCCINYLCHOLINE CHLORIDE 200 MG: 20 INJECTION, SOLUTION INTRAMUSCULAR; INTRAVENOUS at 04:27

## 2023-10-26 RX ADMIN — ONDANSETRON 4 MG: 2 INJECTION INTRAMUSCULAR; INTRAVENOUS at 02:55

## 2023-10-26 ASSESSMENT — PAIN SCALES - GENERAL
PAINLEVEL_OUTOF10: 8
PAINLEVEL_OUTOF10: 7
PAINLEVEL_OUTOF10: 6
PAIN_LEVEL: 0
PAINLEVEL_OUTOF10: 0 - NO PAIN
PAINLEVEL_OUTOF10: 0 - NO PAIN
PAINLEVEL_OUTOF10: 5 - MODERATE PAIN
PAINLEVEL: 9
PAINLEVEL_OUTOF10: 7
PAINLEVEL_OUTOF10: 8
PAINLEVEL_OUTOF10: 8
PAINLEVEL_OUTOF10: 0 - NO PAIN
PAINLEVEL_OUTOF10: 0 - NO PAIN
PAINLEVEL_OUTOF10: 6
PAINLEVEL_OUTOF10: 7
PAINLEVEL_OUTOF10: 7
PAINLEVEL_OUTOF10: 0 - NO PAIN
PAINLEVEL_OUTOF10: 0 - NO PAIN
PAINLEVEL_OUTOF10: 5 - MODERATE PAIN
PAINLEVEL_OUTOF10: 0 - NO PAIN

## 2023-10-26 ASSESSMENT — ACTIVITIES OF DAILY LIVING (ADL): LACK_OF_TRANSPORTATION: NO

## 2023-10-26 NOTE — L&D DELIVERY NOTE
OB Delivery Note  10/26/2023  Terra Mathew  27 y.o.     Gestational Age: 28w2d  /Para:   Estimated Blood Loss:   Delivery Blood Loss  10/26/23 0146 - 10/26/23 0613      Total Blood Loss - Surgical Delivery (mL) Anesthesia 500 mL    Total  500 mL          Quantitative Blood Loss: Admission to Discharge: 500 mL (10/26/2023  1:46 AM - 10/26/2023  6:13 AM)    Ruth Mathew [[id number]]      Labor Events    Sac identifier: Sac 1  Rupture date/time: 10/26/2023 0430  Rupture type: Artificial  Fluid color: Clear  Fluid odor: None  Labor type:  Without Labor  Labor allowed to proceed with plans for an attempted vaginal birth?: No  Complications: Non reassuring fetal heart tracing remote from delivery       Labor Event Times    Labor onset date/time: 10/26/2023 0146  Decision date/time (emergent ): 10/26/2023 0419       Placenta    Placenta delivery date/time: 10/26/2023 0433  Placenta removal: Manual removal  Placenta appearance: Intact  Placenta disposition: pathology       Cord    Vessels: 3 vessels  Complications: None  Delayed cord clamping?: No  Cord clamped date/time: 10/26/2023 0431  Gases sent?: No  Stem cell collection (by provider): No       Lacerations    Episiotomy: None  Perineal laceration: None       Anesthesia    Method: General       Operative Delivery    Forceps attempted?: No  Vacuum extractor attempted?: No       Shoulder Dystocia    Shoulder dystocia present?: No       [place] Delivery    Time head delivered: 10/26/2023 04:31:00  Birth date/time: 10/26/2023 04:31:00  Delivery type: , Low Transverse   categorization: primary   priority: urgent  Indications for : Persistent Category 2 Tracing Remote from Delivery, Severe RUQ pain and SPEC   Incision type: low transverse  Complications: Fetal Intolerance       Apgars    Living status: Living  Apgar Component Scores:  1 min.:  5 min.:  10 min.:  15 min.:  20 min.:    Skin color:  0  1        Heart rate:  1  2       Reflex irritability:  1  1       Muscle tone:  0  2       Respiratory effort:  0  2       Total:  2  8       Apgars assigned by: JULIETA CARVER       Delivery Providers    Delivering clinician: Shereen Navas MD   Provider Role    Bessy Simon, RN Delivery Nurse    Mario Wilson, RN Nursery Nurse    Danielle Vallecillo MD Resident               Op Note:  Patient to the OR urgently for non reassuring fetal heart tones: minimal variability and recurrent prolonged decelerations in the setting of newly dx sPEC, suspected severe FGR     Post op diagnosis: Same     Procedure: Pt was taken to the OR. She was then placed in the dorsal supine position with a left lateral tilt. A nixon catheter was placed, SCD’s were applied, and a vaginal prep was performed. A pre-procedure time out was performed.  The pt was given prophylactic dose of IV antibiotics.   She was then prepped and draped in the usual sterile fashion. Then, general anesthesia was administered.     A Pfannenstiel skin incision was made with the scalpel through the skin and subcutaneous fat to the underlying fascial layer.  The fascia was incised in the midline with the scalpel and the incision was extended bilaterally. The superior aspect of the incision was grasped, tented up with Kocher clamps and the rectus muscle was dissected off. The muscles were divided in the midline, the peritoneum was then identified and entered bluntly and incision extended superiorly and inferiorly with good visualization of bladder below.        A low transverse uterine incision was made with the scalpel, the uterine cavity was entered, and the hysterotomy was extended cephalocaudally by stretching.  The infant was delivered atraumatically, the cord was clamped and cut and infant was handed off to awaiting nursing.  A cord segment was collected.  The placenta was then expressed.  The uterine incision was repaired using a running locked stitch of 0-Vicryl.  Good hemostasis was noted.     The underside of the fascia and bladder and the rectus muscles were also found to be hemostatic.  The fascia was closed using a running stitch of 0-PDS.  The subcutaneous layer was irrigated, small bleeders were cauterized, and the subcutaneous layer was reapproximated using a running stitch of 2-0 Monocryl. The skin was closed with 4-0 Vicryl on a curved needle.  All counts were correct, the patient tolerated the procedure well.  Dr. Navas was present for the entire procedure.  The patient and infant were taken back to the recovery room in stable condition.     MD MARIAMA Jansen was present during all critical and key portions of the procedure(s) and immediately available to furnish services the entire duration.  See resident note for details.     Shereen Navas MD

## 2023-10-26 NOTE — ANESTHESIA POSTPROCEDURE EVALUATION
Patient: Terra Mathew    Procedure Summary       Date: 10/26/23 Room / Location: [facility] 01 / [facility] 2 OB    Anesthesia Start: 421 Anesthesia Stop: 545    Procedure: OBGYN Delivery  Section Diagnosis: (Fetal Intolerance of Labor)    Surgeons: Danielle Willis MD Responsible Provider: Telma Lazcano MD    Anesthesia Type: general, CSE ASA Status: 2            Anesthesia Type: general, CSE    Vitals Value Taken Time   /111 10/26/23 0602   Temp 36.5 10/26/23 0612   Pulse 89 10/26/23 0607   Resp 16 10/26/23 0612   SpO2 98 % 10/26/23 0607       Anesthesia Post Evaluation    Patient location during evaluation: bedside  Patient participation: complete - patient participated  Level of consciousness: awake and alert  Pain score: 0  Pain management: satisfactory to patient  Airway patency: patent  Cardiovascular status: stable  Respiratory status: room air  Hydration status: stable  Comments: Patient reevaluated, ambulating, urinating, legs feel back to normal.      No notable events documented.

## 2023-10-26 NOTE — ANESTHESIA PREPROCEDURE EVALUATION
Patient: Terra Mathew    Evaluation Method: In-person visit    Procedure Information    Date: 10/26/23  Procedure: Labor Analgesia         Relevant Problems   Anesthesia (within normal limits)      Cardiovascular (within normal limits)      Endocrine (within normal limits)      GI (within normal limits)      /Renal (within normal limits)      Neuro/Psych (within normal limits)      Pulmonary (within normal limits)      GI/Hepatic (within normal limits)      Hematology (within normal limits)      Musculoskeletal (within normal limits)      Eyes, Ears, Nose, and Throat (within normal limits)      Infectious Disease (within normal limits)       Clinical information reviewed:    Allergies                NPO Detail:  No data recorded     OB/GYN     Physical Exam    Airway  Mallampati: II  TM distance: >3 FB  Neck ROM: full     Cardiovascular    Dental - normal exam     Pulmonary    Abdominal            Anesthesia Plan    ASA 2     spinal     Anesthetic plan and risks discussed with patient.  Use of blood products discussed with patient who consented to blood products.    Plan discussed with CAA and attending.

## 2023-10-26 NOTE — ANESTHESIA PROCEDURE NOTES
Spinal Block    Start time: 10/26/2023 5:10 AM  End time: 10/26/2023 5:25 AM  Reason for block: post-op pain management  Staffing  Performed: THERESA   Authorized by: Telma Lazcano MD    Performed by: THERESA Longoria    Preanesthetic Checklist  Completed: patient identified, IV checked, risks and benefits discussed, surgical consent, pre-op evaluation, timeout performed and sterile techniques followed  Block Timeout  RN/Licensed healthcare professional reads aloud to the Anesthesia provider and entire team: Patient identity, procedure with side and site, patient position, and as applicable the availability of implants/special equipment/special requirements.    Timeout performed at: 10/26/2023 5:11 AM  Spinal Block  Patient position: right lateral decubitus  Prep: ChloraPrep  Sterility prep: drape, cap, gloves and mask  Sedation level: general anesthesia  Patient monitoring: blood pressure, continuous pulse oximetry, EKG, ETCO2 and heart rate  Approach: midline  Vertebral space: L3-4  Injection technique: single-shot  Needle  Needle type: pencil-point   Free flowing CSF: yes    Assessment  Procedure assessment: patient tolerated procedure well with no immediate complications

## 2023-10-26 NOTE — SIGNIFICANT EVENT
"Called to bedside by L&D team to briefly discuss with mom what it means to have a premature baby.    Met Terra at bedside with her mother. Terra expressed concern in having a premature baby as she had no previous experience with premature babies and also wanted to have some time prior to deciding upon having a c/s or not. I explained to Terra that given her elevated blood pressures, there was concern for placenta insufficiency therefore concern if her son \"Abraham\" was being adequately supported or not. I explained to Terra that this was no fault of hers and that in discussion with OB it would be beneficial for her to deliver today.   I explained that Abraham had a great chance of survivial at 28wks gestation. I briefly discussed RDS, need for CPAP v intubation and possible surfactant administration. We discussed parenteral nutrition either via PIV or UVC and possible need for UAC. I encouraged mom to provide breastmilk for Abraham and that we would offer donor breastmilk as supplementation. Mom's initial plan was to use formula, I discussed given risk of NEC with premature babies and formula, we do not introduce formula until at least 34wk PMA. Discussed feeds via NG or OG and that would likely start feeds at DOL 1 if he's doing well.  I discussed that given prematurity, Abraham might have trouble with math and reading at school but should overall do well given gestation age.     Given acuity of situation, I did not discuss extensively with Terra what it meant to have a  infant. I did say another provider from NICU will talk to her more in depth during the day, once she's recovered from delivery. Terra and her mom showed understanding. They were hesitant but ultimately wanted to make the best choice for Abraham.  I respected this decision and assured Terra and her mom that we were all on the same page.  We would be here and happy to take care of Abraham during and after delivery.     Elliott GAMEZ" MD Kya

## 2023-10-26 NOTE — H&P
Obstetrical Admission History and Physical    Assessment/Plan    Terra Mathew is a 27 y.o.  at 28w2d by 17wk US admitted for severe pre-eclampsia    Severe pre-eclampsia  - Diagnosed by severe range BPs requiring IR treatment  - HELLP labs pending on admission  - Epigastric/RUQ pain present, will treat with zofran and evaluate for improvement  - Start nifedipine 30mg daily  - Start magnesium for seizure prophylaxis  - Given severe RUQ pain, BPs >200 systolic and Cat II tracing, concern for HELLP syndrome vs. Placental abruption requiring delivery. Discussed indications for delivery with patient and possible need for emergency CS for Cat III tracing. Reviewed risks of CS including bleeding, infection, damage to intraabdominal organs. Patient expressed understanding  - Will keep patient NPO    Limited PNC  - Patient with intermittent prenatal care, prenatal panel drawn on admission  - Will check BG levels, no 1hr GCT    Fetal wellbeing  - FHT Cat II on admission with minimal variability and spontaneous decelerations  - BMZ#1 to be given, for second dose in 24 hours if still pregnant  - Will start magnesium for fetal neuroprotection  - BSUS with fetus cephalic, anterior placenta, MELANIA 8cm, EFW 723g   - Will collect GBS    Seen and discussed with Dr. Yosef Capellan MD  PGY-3, Obstetrics and Gynecology      Subjective   Patient presents with severe abdominal pain that started at 8pm last night. Pain is epigastric, comes and goes in wavers. Does not feel like contractions, denies LOF, VB. Reports good FM. Denies prior history of HTN. Denies headache, vision changes, chest pain, SOB. Denies complications with pregnancy, previously midwife patient.    Pregnancy notables:  - Limited PNC    Obstetrical History   OB History    Para Term  AB Living   1             SAB IAB Ectopic Multiple Live Births                  # Outcome Date GA Lbr Giacomo/2nd Weight Sex Delivery Anes PTL Lv   1 Current                 Past Medical History  Denies    Past Surgical History   Denies    Social History  Denies tobacco, alcohol, drug use    Allergies  Aspirin - hives    Medications  Medications Prior to Admission   Medication Sig Dispense Refill Last Dose    albuterol (Ventolin HFA) 90 mcg/actuation inhaler    Past Week    PNV no.153/FA/om3/dha/epa/fish (PRENATAL GUMMIES ORAL) Take by mouth. 0.4-32.5mg oral tablet; chew and swallow one gummy by mouth daily       prenatal no115/iron/folic acid (PRENATAL 19 ORAL)    10/26/2023       Objective    Last Vitals  Temp Pulse Resp BP MAP O2 Sat   36.8 °C (98.2 °F) 68   (!) 208/125   100 %     Physical Examination  General: no acute distress  HEENT: normocephalic, atraumatic  Heart: warm and well perfused  Lungs: breathing comfortably on room air  Abdomen: gravid, tender to palpation over epigastric region and RUQ, no rebound/guarding  Extremities: moving all extremities  Neuro: awake and conversant  Psych: appropriate mood and affect  SVE: 1/90/-2  FHT: 155/min/-accel/+spontaneous decelerations    Lab Review  Labs in chart were reviewed.

## 2023-10-26 NOTE — ANESTHESIA PROCEDURE NOTES
Airway  Date/Time: 10/26/2023 4:28 AM  Urgency: emergent    Airway not difficult    Staffing  Performed: THERESA   Authorized by: Telma Lazcano MD    Performed by: THERESA Longoria  Patient location during procedure: OR    Consent for Airway (if performed for an anesthetic, see related documentation for consents)  Patient identity confirmed: verbally with patient and arm band  Consent: Verbal consent obtained. Written consent obtained.  Risks and benefits: risks, benefits and alternatives were discussed  Consent given by: patient      Indications and Patient Condition  Indications for airway management: anesthesia  Spontaneous Ventilation: absent  Sedation level: deep  Preoxygenated: yes  Patient position: sniffing  Mask difficulty assessment: 0 - not attempted  Planned trial extubation    Final Airway Details  Final airway type: endotracheal airway      Successful airway: ETT  Cuffed: yes   Successful intubation technique: video laryngoscopy  Endotracheal tube insertion site: oral  ETT size (mm): 6.5  Cormack-Lehane Classification: grade I - full view of glottis  Placement verified by: chest auscultation and capnometry   Measured from: lips  ETT to lips (cm): 21  Number of attempts at approach: 1

## 2023-10-26 NOTE — INDIVIDUALIZED OVERALL PLAN OF CARE NOTE
Decision for delivery    Terra Mathew is a 26yo  at 28.2wga by 17wk US who presented to labor and delivery with severe abdominal pain, found to have severe range BPs diagnostic of severe pre-eclampsia. FHT tracing category II bordering on Category III: 155/min/-accel/+spontaneous prolonged decelerations.    HELLP labs currently pending, s/p BMZ#1, s/p magnesium 6g bolus now on magnesium gtt. BPs now normotensive to mild range. Discussed plan of care with EVIE Castanon attending on call. Given concern for fetal compromise, recommend delivery via urgent CS at this time. NICU and anesthesia teams made aware. Will proceed to OR for urgent CS for concerning FHT tracing remote from delivery in setting of sPEC.    Patricia Capellan MD  PGY-3, Obstetrics and Gynecology

## 2023-10-26 NOTE — CARE PLAN
The patient's goals for the shift include  managing pain levels.    The clinical goals for the shift include minimizing signs and symptoms of HDP.    Over the shift, the patient did make progress toward the following goals.

## 2023-10-26 NOTE — PROGRESS NOTES
Postpartum Progress Note    Assessment/Plan   27 y.o., G1 now  POD#0 s/p urgent  delivery under general anesthesia @ 28.2 wga for non-reassuring fetal heart tones in the setting of newly-diagnosed pre-eclampsia with severe features and suspected severe fetal growth restriction. Pt on labor and delivery for mag gtt ppx in setting of sPEC diagnosed by severe-range BPs and lab abnormalities.    sPEC  - dx'd based on severe-range BPs requiring IV antihypertensives  - s/p labetalol 20/40/80, hydral 10/10 (10/26 AM)  - Nifedipine 120 mg daily (rapidly uptitrated since delivery)  - Reports mild epigastric pain, denies headache, scotoma  - AST/ALT uptrending (192/124 > 302/145)  - PLT downtrending (205 > 109)  - Trend HELLP labs q4h. Expect to resolve s/p delivery. Will obtain RUQ US for epigastric pain if LFTs continue to uptrend.  - UOP borderline low. Continue strict I/O's.  - Continue mag gtt    PPBC: undecided    Discussed with Dr. Valadez-Mike Oilver MD  Labor and Delivery    Principal Problem:    Elevated blood pressure affecting pregnancy in third trimester, antepartum    Pregnancy Problems (from 10/26/23 to present)       Problem Noted Resolved    Elevated blood pressure affecting pregnancy in third trimester, antepartum 10/26/2023 by Patricia Capellan MD No    Priority:  Medium            Subjective   Reports mild epigastric pain in area of incision. Denies explicit RUQ pain, headaches, scotoma.    Objective   Allergies:   Aspirin         Last Vitals:  Temp Pulse Resp BP MAP Pulse Ox   36 °C (96.8 °F) 82 17 133/89   99 %     Vitals Min/Max Last 24 Hours:  Temp  Min: 36 °C (96.8 °F)  Max: 36.8 °C (98.2 °F)  Pulse  Min: 67  Max: 114  Resp  Min: 16  Max: 18  BP  Min: 130/85  Max: 211/136    Intake/Output:     Intake/Output Summary (Last 24 hours) at 10/26/2023 1031  Last data filed at 10/26/2023 1003  Gross per 24 hour   Intake 2075.62 ml   Output 801 ml   Net 1274.62 ml       Physical  Exam:  General: Examination reveals a well developed, well nourished, female, in no acute distress. She is alert and cooperative.  HEENT: External ears normal. Nose normal, no erythema or discharge. Mouth and throat clear.  Neck: supple, no significant adenopathy.  Lungs: unlabored breathing.  Cardiac: regular rate.  Abdomen: soft, gravid, nontender, nondistended, no abnormal masses, no epigastric pain.  Extremities: no limitation in range of motion.  Neurological: alert, oriented, normal speech, no focal findings or movement disorder noted.  Psychological: awake and alert; oriented to person, place, and time.    Lab Data:  Results for orders placed or performed during the hospital encounter of 10/26/23 (from the past 96 hour(s))   Type And Screen   Result Value Ref Range    ABO TYPE O     Rh TYPE POS     ANTIBODY SCREEN NEG    CBC   Result Value Ref Range    WBC 10.9 4.4 - 11.3 x10*3/uL    nRBC 0.0 0.0 - 0.0 /100 WBCs    RBC 4.47 4.00 - 5.20 x10*6/uL    Hemoglobin 13.1 12.0 - 16.0 g/dL    Hematocrit 39.4 36.0 - 46.0 %    MCV 88 80 - 100 fL    MCH 29.3 26.0 - 34.0 pg    MCHC 33.2 32.0 - 36.0 g/dL    RDW 12.0 11.5 - 14.5 %    Platelets 205 150 - 450 x10*3/uL    MPV 11.4 7.5 - 11.5 fL   Lactate Dehydrogenase   Result Value Ref Range     (H) 84 - 246 U/L   Comprehensive Metabolic Panel   Result Value Ref Range    Glucose 83 74 - 99 mg/dL    Sodium 139 136 - 145 mmol/L    Potassium 4.5 3.5 - 5.3 mmol/L    Chloride 105 98 - 107 mmol/L    Bicarbonate 21 21 - 32 mmol/L    Anion Gap 18 10 - 20 mmol/L    Urea Nitrogen 18 6 - 23 mg/dL    Creatinine 1.15 (H) 0.50 - 1.05 mg/dL    eGFR 67 >60 mL/min/1.73m*2    Calcium 9.3 8.6 - 10.6 mg/dL    Albumin 3.3 (L) 3.4 - 5.0 g/dL    Alkaline Phosphatase 142 (H) 33 - 110 U/L    Total Protein 6.4 6.4 - 8.2 g/dL     (H) 9 - 39 U/L    Bilirubin, Total 0.7 0.0 - 1.2 mg/dL     (H) 7 - 45 U/L   Syphilis Screen with Reflex   Result Value Ref Range    Syphilis Total Ab  Nonreactive Nonreactive   Hepatitis B Surface Antigen   Result Value Ref Range    Hepatitis B Surface AG Nonreactive Nonreactive   Hepatitis C Antibody   Result Value Ref Range    Hepatitis C AB Nonreactive Nonreactive   Coagulation Screen   Result Value Ref Range    Protime 9.7 (L) 9.8 - 12.8 seconds    INR 0.9 0.9 - 1.1    aPTT 26 (L) 27 - 38 seconds   Fibrinogen   Result Value Ref Range    Fibrinogen 531 (H) 200 - 400 mg/dL   Type and screen   Result Value Ref Range    ABO TYPE O     Rh TYPE POS     ANTIBODY SCREEN NEG    Prepare RBC: 2 Units   Result Value Ref Range    PRODUCT CODE G7706E12     Unit Number [id number]     Unit ABO O     Unit RH POS     XM INTEP COMP     Dispense Status XM     Blood Expiration Date November 12, 2023 23:59 EST     PRODUCT BLOOD TYPE 5100     UNIT VOLUME 350     PRODUCT CODE Z7522Q21     Unit Number [id number]     Unit ABO O     Unit RH POS     XM INTEP COMP     Dispense Status XM     Blood Expiration Date November 12, 2023 23:59 EST     PRODUCT BLOOD TYPE 5100     UNIT VOLUME 350    Comprehensive Metabolic Panel   Result Value Ref Range    Glucose 90 74 - 99 mg/dL    Sodium 137 136 - 145 mmol/L    Potassium 4.4 3.5 - 5.3 mmol/L    Chloride 103 98 - 107 mmol/L    Bicarbonate 18 (L) 21 - 32 mmol/L    Anion Gap 20 10 - 20 mmol/L    Urea Nitrogen 17 6 - 23 mg/dL    Creatinine 1.10 (H) 0.50 - 1.05 mg/dL    eGFR 71 >60 mL/min/1.73m*2    Calcium 9.2 8.6 - 10.6 mg/dL    Albumin 3.3 (L) 3.4 - 5.0 g/dL    Alkaline Phosphatase 156 (H) 33 - 110 U/L    Total Protein 6.5 6.4 - 8.2 g/dL     (H) 9 - 39 U/L    Bilirubin, Total 1.2 0.0 - 1.2 mg/dL     (H) 7 - 45 U/L   CBC   Result Value Ref Range    WBC 18.5 (H) 4.4 - 11.3 x10*3/uL    nRBC 0.0 0.0 - 0.0 /100 WBCs    RBC 4.86 4.00 - 5.20 x10*6/uL    Hemoglobin 14.5 12.0 - 16.0 g/dL    Hematocrit 42.9 36.0 - 46.0 %    MCV 88 80 - 100 fL    MCH 29.8 26.0 - 34.0 pg    MCHC 33.8 32.0 - 36.0 g/dL    RDW 12.5 11.5 - 14.5 %     Platelets 109 (L) 150 - 450 x10*3/uL    MPV 11.0 7.5 - 11.5 fL

## 2023-10-27 LAB
ALBUMIN SERPL BCP-MCNC: 2.7 G/DL (ref 3.4–5)
ALP SERPL-CCNC: 111 U/L (ref 33–110)
ALT SERPL W P-5'-P-CCNC: 76 U/L (ref 7–45)
ANION GAP SERPL CALC-SCNC: 13 MMOL/L (ref 10–20)
AST SERPL W P-5'-P-CCNC: 77 U/L (ref 9–39)
BILIRUB SERPL-MCNC: 0.5 MG/DL (ref 0–1.2)
BUN SERPL-MCNC: 22 MG/DL (ref 6–23)
CALCIUM SERPL-MCNC: 7.6 MG/DL (ref 8.6–10.6)
CHLORIDE SERPL-SCNC: 98 MMOL/L (ref 98–107)
CO2 SERPL-SCNC: 21 MMOL/L (ref 21–32)
CREAT SERPL-MCNC: 1.14 MG/DL (ref 0.5–1.05)
ERYTHROCYTE [DISTWIDTH] IN BLOOD BY AUTOMATED COUNT: 12.8 % (ref 11.5–14.5)
GFR SERPL CREATININE-BSD FRML MDRD: 68 ML/MIN/1.73M*2
GLUCOSE SERPL-MCNC: 106 MG/DL (ref 74–99)
HCT VFR BLD AUTO: 32.4 % (ref 36–46)
HGB BLD-MCNC: 11.1 G/DL (ref 12–16)
MCH RBC QN AUTO: 29.1 PG (ref 26–34)
MCHC RBC AUTO-ENTMCNC: 34.3 G/DL (ref 32–36)
MCV RBC AUTO: 85 FL (ref 80–100)
NRBC BLD-RTO: 0 /100 WBCS (ref 0–0)
PLATELET # BLD AUTO: 79 X10*3/UL (ref 150–450)
PMV BLD AUTO: 12.2 FL (ref 7.5–11.5)
POTASSIUM SERPL-SCNC: 4.2 MMOL/L (ref 3.5–5.3)
PROT SERPL-MCNC: 5.4 G/DL (ref 6.4–8.2)
RBC # BLD AUTO: 3.81 X10*6/UL (ref 4–5.2)
SODIUM SERPL-SCNC: 128 MMOL/L (ref 136–145)
WBC # BLD AUTO: 17.1 X10*3/UL (ref 4.4–11.3)

## 2023-10-27 PROCEDURE — 1210000001 HC SEMI-PRIVATE ROOM DAILY

## 2023-10-27 PROCEDURE — 36415 COLL VENOUS BLD VENIPUNCTURE: CPT | Performed by: STUDENT IN AN ORGANIZED HEALTH CARE EDUCATION/TRAINING PROGRAM

## 2023-10-27 PROCEDURE — 2500000004 HC RX 250 GENERAL PHARMACY W/ HCPCS (ALT 636 FOR OP/ED): Performed by: STUDENT IN AN ORGANIZED HEALTH CARE EDUCATION/TRAINING PROGRAM

## 2023-10-27 PROCEDURE — 2500000005 HC RX 250 GENERAL PHARMACY W/O HCPCS: Performed by: STUDENT IN AN ORGANIZED HEALTH CARE EDUCATION/TRAINING PROGRAM

## 2023-10-27 PROCEDURE — 96372 THER/PROPH/DIAG INJ SC/IM: CPT | Performed by: STUDENT IN AN ORGANIZED HEALTH CARE EDUCATION/TRAINING PROGRAM

## 2023-10-27 PROCEDURE — 80053 COMPREHEN METABOLIC PANEL: CPT | Performed by: STUDENT IN AN ORGANIZED HEALTH CARE EDUCATION/TRAINING PROGRAM

## 2023-10-27 PROCEDURE — 85027 COMPLETE CBC AUTOMATED: CPT | Performed by: STUDENT IN AN ORGANIZED HEALTH CARE EDUCATION/TRAINING PROGRAM

## 2023-10-27 RX ORDER — SODIUM CHLORIDE, SODIUM LACTATE, POTASSIUM CHLORIDE, CALCIUM CHLORIDE 600; 310; 30; 20 MG/100ML; MG/100ML; MG/100ML; MG/100ML
75 INJECTION, SOLUTION INTRAVENOUS CONTINUOUS
Status: DISCONTINUED | OUTPATIENT
Start: 2023-10-27 | End: 2023-10-27

## 2023-10-27 RX ADMIN — ACETAMINOPHEN 975 MG: 325 TABLET ORAL at 06:52

## 2023-10-27 RX ADMIN — NIFEDIPINE 90 MG: 90 TABLET, FILM COATED, EXTENDED RELEASE ORAL at 06:53

## 2023-10-27 RX ADMIN — ENOXAPARIN SODIUM 40 MG: 40 INJECTION SUBCUTANEOUS at 00:47

## 2023-10-27 RX ADMIN — ACETAMINOPHEN 975 MG: 325 TABLET ORAL at 12:47

## 2023-10-27 RX ADMIN — LIDOCAINE 1 PATCH: 4 PATCH TOPICAL at 16:37

## 2023-10-27 RX ADMIN — SODIUM CHLORIDE, POTASSIUM CHLORIDE, SODIUM LACTATE AND CALCIUM CHLORIDE 75 ML/HR: 600; 310; 30; 20 INJECTION, SOLUTION INTRAVENOUS at 02:51

## 2023-10-27 RX ADMIN — ACETAMINOPHEN 975 MG: 325 TABLET ORAL at 00:36

## 2023-10-27 RX ADMIN — ACETAMINOPHEN 975 MG: 325 TABLET ORAL at 18:37

## 2023-10-27 SDOH — SOCIAL STABILITY: SOCIAL INSECURITY: ABUSE SCREEN: ADULT

## 2023-10-27 SDOH — HEALTH STABILITY: MENTAL HEALTH: WISH TO BE DEAD (PAST 1 MONTH): NO

## 2023-10-27 SDOH — HEALTH STABILITY: MENTAL HEALTH: NON-SPECIFIC ACTIVE SUICIDAL THOUGHTS (PAST 1 MONTH): NO

## 2023-10-27 SDOH — HEALTH STABILITY: MENTAL HEALTH: SUICIDAL BEHAVIOR (LIFETIME): NO

## 2023-10-27 SDOH — HEALTH STABILITY: MENTAL HEALTH: HAVE YOU USED ANY PRESCRIPTION DRUGS OTHER THAN PRESCRIBED IN THE PAST 12 MONTHS?: NO

## 2023-10-27 SDOH — SOCIAL STABILITY: SOCIAL INSECURITY: HAS ANYONE EVER THREATENED TO HURT YOUR FAMILY OR YOUR PETS?: NO

## 2023-10-27 SDOH — SOCIAL STABILITY: SOCIAL INSECURITY: PHYSICAL ABUSE: DENIES

## 2023-10-27 SDOH — SOCIAL STABILITY: SOCIAL INSECURITY: DOES ANYONE TRY TO KEEP YOU FROM HAVING/CONTACTING OTHER FRIENDS OR DOING THINGS OUTSIDE YOUR HOME?: NO

## 2023-10-27 SDOH — SOCIAL STABILITY: SOCIAL INSECURITY: ARE YOU OR HAVE YOU BEEN THREATENED OR ABUSED PHYSICALLY, EMOTIONALLY, OR SEXUALLY BY ANYONE?: NO

## 2023-10-27 SDOH — HEALTH STABILITY: MENTAL HEALTH: HAVE YOU USED ANY SUBSTANCES (CANABIS, COCAINE, HEROIN, HALLUCINOGENS, INHALANTS, ETC.) IN THE PAST 12 MONTHS?: NO

## 2023-10-27 SDOH — ECONOMIC STABILITY: HOUSING INSECURITY: DO YOU FEEL UNSAFE GOING BACK TO THE PLACE WHERE YOU ARE LIVING?: NO

## 2023-10-27 SDOH — SOCIAL STABILITY: SOCIAL INSECURITY: HAVE YOU HAD THOUGHTS OF HARMING ANYONE ELSE?: NO

## 2023-10-27 SDOH — SOCIAL STABILITY: SOCIAL INSECURITY: ARE THERE ANY APPARENT SIGNS OF INJURIES/BEHAVIORS THAT COULD BE RELATED TO ABUSE/NEGLECT?: NO

## 2023-10-27 SDOH — SOCIAL STABILITY: SOCIAL INSECURITY: DO YOU FEEL ANYONE HAS EXPLOITED OR TAKEN ADVANTAGE OF YOU FINANCIALLY OR OF YOUR PERSONAL PROPERTY?: NO

## 2023-10-27 SDOH — SOCIAL STABILITY: SOCIAL INSECURITY: VERBAL ABUSE: DENIES

## 2023-10-27 ASSESSMENT — LIFESTYLE VARIABLES
SKIP TO QUESTIONS 9-10: 1
HOW OFTEN DO YOU HAVE 6 OR MORE DRINKS ON ONE OCCASION: NEVER
HOW MANY STANDARD DRINKS CONTAINING ALCOHOL DO YOU HAVE ON A TYPICAL DAY: PATIENT DOES NOT DRINK
AUDIT-C TOTAL SCORE: 0
HOW OFTEN DO YOU HAVE A DRINK CONTAINING ALCOHOL: NEVER
AUDIT-C TOTAL SCORE: 0

## 2023-10-27 ASSESSMENT — PAIN SCALES - GENERAL
PAINLEVEL_OUTOF10: 6
PAINLEVEL_OUTOF10: 4
PAINLEVEL_OUTOF10: 7
PAINLEVEL_OUTOF10: 0 - NO PAIN
PAINLEVEL_OUTOF10: 6
PAINLEVEL_OUTOF10: 0 - NO PAIN
PAINLEVEL_OUTOF10: 0 - NO PAIN
PAINLEVEL_OUTOF10: 7
PAINLEVEL_OUTOF10: 4
PAINLEVEL_OUTOF10: 7
PAINLEVEL_OUTOF10: 1
PAINLEVEL_OUTOF10: 7
PAINLEVEL_OUTOF10: 9

## 2023-10-27 ASSESSMENT — PATIENT HEALTH QUESTIONNAIRE - PHQ9
SUM OF ALL RESPONSES TO PHQ9 QUESTIONS 1 & 2: 1
2. FEELING DOWN, DEPRESSED OR HOPELESS: NOT AT ALL
1. LITTLE INTEREST OR PLEASURE IN DOING THINGS: SEVERAL DAYS

## 2023-10-27 ASSESSMENT — PAIN DESCRIPTION - DESCRIPTORS
DESCRIPTORS: SHARP
DESCRIPTORS: ACHING
DESCRIPTORS: SHARP

## 2023-10-27 NOTE — PROGRESS NOTES
Terra Mathew is a 27 y.o., , who had a , Low Transverse  delivery on 10/26/2023  at 28w2d and is now POD1.    She received a spinal duramorph injection post general anesthesia without immediate complications noted.       Pain was appropriately tolerated by patient.     Vitals:    10/27/23 0516   BP: 111/71   Pulse: 94   Resp: 16   Temp: 36.6 °C (97.9 °F)   SpO2: 95%       Neuraxial site assessed. No visible redness or swelling. Pain acceptably controlled. Patient able to ambulate and move all extremities without difficulty. Able to void. No complaints of nausea/vomiting. Tolerating PO intake well. No s/sx of PDPH.     Neuraxial site without redness, drainage, swelling.  Neuromuscular block resolved.    Anesthesia will sign off     Chuck Franks, CAA

## 2023-10-27 NOTE — PROGRESS NOTES
"Ruth Mathew (Jens Mathew) is a 1 days old male born at Gestational Age: 28w2d via  ( )on 10/26/[birth year]23. Pt was admitted to the NICU due to:  1. RDS (respiratory distress syndrome of )  XR babygram     XR babygram       SW met with MOB and maternal Grandmother at bedside for SW assessment.      Address: [address] [place] [address] [place] [address]  Others in the home: Maternal grandmother (Ailin Mathew), cousin  MOB: Terra Mathew, [phone], address above  FOB: Rico Matthew, [phone], MOB did not know FOB's address from memory  Insurance: [employer]- HRS referral placed  Support: Maternal grandmother, MOB's counselor (through [facility])  Mental Health/ Postpartum Depression: MOB reports history of depression when younger and reports feeling angry recently and sometimes happy. States \"its hard to explain\" but reports feeling well supported/feels she is coping well.  Substance Use: denies  Safety: denies concerns  Pediatrician: is between [facility] on [place] and . Provided list of  pediatric locations  Employment: MOB is unemployed  Car seat: yes  Safe Sleep education/space: has safe sleep space and safe sleep education provided  Transportation/parking: MOB came to L&D in ambulance and plans to go home with FOB. Informed that  can assist with transport home if needed.   Resources provided: HRS referral placed, Pediatricians list provided, and provided information for MOB to call down to NICU for pt updates.     No social work concerns identified. Pt and MOB are cleared for discharge from SW perspective.   FELIX Guadalupe                 "

## 2023-10-27 NOTE — INDIVIDUALIZED OVERALL PLAN OF CARE NOTE
"To bedside to evaluate patient prior to magnesium discontinuation. Patient feeling well, denies all pre-eclampsia symptoms.    /63   Pulse 95   Temp 36.5 °C (97.7 °F) (Temporal)   Resp 18   Ht 1.6 m (5' 3\")   Wt 81.6 kg (180 lb)   LMP 04/15/2023   SpO2 97%   Breastfeeding No   BMI 31.89 kg/m²       sPEC, c/f HELLP syndrome  - dx'd based on severe-range BPs requiring IV antihypertensives  - no IV antihypertensives required overnight  - Nifedipine 90 mg daily, BPs currently normotensive  - HELLP labs notable for:              - ALT/AST: 124/192 -> 145/302 -> 120/279 -> 109/209              - Cr 1.15 -> 1.10 -> 1.03 -> 1.00              - Plt 205 -> 109 -> 118 -> 91              - LDH on admission 555  - Repeat HELLP labs ordered for AM draw  - Will now discontinue magnesium and transfer to postpartum, stable for transfer    Patricia Capellan MD  PGY-3, Obstetrics and Gynecology    "

## 2023-10-27 NOTE — PROGRESS NOTES
Postpartum Progress Note    Assessment/Plan   Terra Mathew is a 27 y.o., , who was admitted for SPEC, delivered at 28w2d gestation and is now postpartum day 1 s/p PLTCS in s/o new SPEC dx and NRFHT.     Routine postpartum care  - meeting all postpartum and post-op milestones  - voiding spontaneously, tolerating PO diet  - bonding with male infant in NICU  - pain well controlled on po medications (hold NSAIDs d/t SKYLER)  - DVT Score: 5 - encourage ambulation and  SCDs, ppx lovenox held 10/27 d/t plt 79  - O+, Rhogam not indicated  - admission hgb 12.4 -> POD#1 11.  - PPBC: will discuss    sPEC, c/f HELLP syndrome  - dx'd based on severe-range BPs requiring IV antihypertensives  - no IV antihypertensives required overnight  - Nifedipine 90 mg daily, BPs currently normotensive  - HELLP labs notable for:              - ALT/AST: 124/192 -> 145/302 -> 120/279 -> 109/209 -> 76/77              - Cr 1.15 -> 1.10 -> 1.03 -> 1.00 -> 1.14              - Plt 205 -> 109 -> 118 -> 91 -> 79              - LDH on admission 555  - Repeat HELLP labs ordered for AM draw 10/28  - s/p Mg gtt  - overall pt clinically stable this AM, continue to monitor closely    Maternal Well-Being  - emotional support provided    [place] [facility]  - breastfeeding/pumping encouraged; lactation consult prn    Dispo:  anticipate d/c on POD #3 if BP well-controlled and meeting all postpartum milestones, for f/u 1 week for BP check, 2 weeks for incision check, and 1 month with Primary OB provider    CELENA Navarro    Principal Problem:    Elevated blood pressure affecting pregnancy in third trimester, antepartum    Pregnancy Problems (from 10/26/23 to present)       Problem Noted Resolved    Elevated blood pressure affecting pregnancy in third trimester, antepartum 10/26/2023 by Patricia Capellan MD No    Priority:  Medium             section delivery  Patient is not breastfeedingThe patient's blood type is O POS. The baby's blood  type is A POS . Rhogam is not indicated.    Subjective   Her pain is well controlled with current medications  She is passing flatus  She is ambulating well  She is tolerating a Adult diet Regular  She reports no breast or nursing problems  She denies emotional concerns today      Denies HA, vision changes, RUQ pain, chest pain, or SOB.    Objective   Allergies:   Aspirin         Last Vitals:  Temp Pulse Resp BP MAP Pulse Ox   36.8 °C (98.2 °F) 100 (ascuktated) 16 103/70   99 %     Vitals Min/Max Last 24 Hours:  Temp  Min: 36 °C (96.8 °F)  Max: 36.8 °C (98.2 °F)  Pulse  Min: 76  Max: 123  Resp  Min: 16  Max: 18  BP  Min: 99/66  Max: 138/91    Intake/Output:     Intake/Output Summary (Last 24 hours) at 10/27/2023 1858  Last data filed at 10/27/2023 1000  Gross per 24 hour   Intake 1117.3 ml   Output 1604 ml   Net -486.7 ml       Physical Exam:  General: well appearing, well-nourished, postpartum  Obstetric: abdomen soft/non-tender, fundus firm below umbilicus, lochia light, Pfannenstiel incision c/d/i  Skin: No rashes/lesions/erythema  Neuro: A/Ox3, conversational  GI: +BS, +flatus  Respiratory: Even and unlabored on RA, LSCTA BL  Cardiovascular: RRR, normal S1, S2  Extremities: No edema, discoloration, or pain in BLE, equal and palpable DP and PT pulses    Psych: appropriate mood and affect     Lab Data:  Lab Results   Component Value Date    WBC 17.1 (H) 10/27/2023    HGB 11.1 (L) 10/27/2023    HCT 32.4 (L) 10/27/2023    PLT 79 (L) 10/27/2023     Lab Results   Component Value Date    GLUCOSE 106 (H) 10/27/2023     (L) 10/27/2023    K 4.2 10/27/2023    CL 98 10/27/2023    CO2 21 10/27/2023    ANIONGAP 13 10/27/2023    BUN 22 10/27/2023    CREATININE 1.14 (H) 10/27/2023    EGFR 68 10/27/2023    CALCIUM 7.6 (L) 10/27/2023    ALBUMIN 2.7 (L) 10/27/2023    PROT 5.4 (L) 10/27/2023    ALKPHOS 111 (H) 10/27/2023    ALT 76 (H) 10/27/2023    AST 77 (H) 10/27/2023    BILITOT 0.5 10/27/2023

## 2023-10-27 NOTE — SIGNIFICANT EVENT
BP cuff and Home Monitoring  Patient meets criteria for home monitoring of blood pressure post discharge.  Met with patient to assess for availability of home BP monitor.  Patient stated she owns home BP monitor. Patient educated on importance of continuing to monitor BP at home, recording BP on home monitoring log and s/sx of when to call her provider.  Pt verbalized understanding the above information.

## 2023-10-27 NOTE — DISCHARGE INSTRUCTIONS

## 2023-10-28 ENCOUNTER — APPOINTMENT (OUTPATIENT)
Dept: RADIOLOGY | Facility: HOSPITAL | Age: 28
End: 2023-10-28
Payer: COMMERCIAL

## 2023-10-28 PROBLEM — O16.3 ELEVATED BLOOD PRESSURE AFFECTING PREGNANCY IN THIRD TRIMESTER, ANTEPARTUM (HHS-HCC): Status: RESOLVED | Noted: 2023-10-26 | Resolved: 2023-10-28

## 2023-10-28 PROBLEM — O14.13 SEVERE PRE-ECLAMPSIA IN THIRD TRIMESTER (HHS-HCC): Status: ACTIVE | Noted: 2023-10-28

## 2023-10-28 PROBLEM — N92.6 MISSED MENSES: Status: RESOLVED | Noted: 2023-10-05 | Resolved: 2023-10-28

## 2023-10-28 PROBLEM — F32.9 MAJOR DEPRESSIVE DISORDER: Status: ACTIVE | Noted: 2023-10-28

## 2023-10-28 PROBLEM — J45.990 EXERTIONAL ASTHMA (HHS-HCC): Status: ACTIVE | Noted: 2022-10-05

## 2023-10-28 PROBLEM — F20.9 SCHIZOPHRENIA (MULTI): Chronic | Status: ACTIVE | Noted: 2023-10-28

## 2023-10-28 LAB
ALBUMIN SERPL BCP-MCNC: 2.8 G/DL (ref 3.4–5)
ALP SERPL-CCNC: 96 U/L (ref 33–110)
ALT SERPL W P-5'-P-CCNC: 51 U/L (ref 7–45)
ANION GAP SERPL CALC-SCNC: 13 MMOL/L (ref 10–20)
AST SERPL W P-5'-P-CCNC: 36 U/L (ref 9–39)
BILIRUB SERPL-MCNC: 0.4 MG/DL (ref 0–1.2)
BNP SERPL-MCNC: 27 PG/ML (ref 0–99)
BUN SERPL-MCNC: 18 MG/DL (ref 6–23)
CALCIUM SERPL-MCNC: 7.7 MG/DL (ref 8.6–10.6)
CARDIAC TROPONIN I PNL SERPL HS: 11 NG/L (ref 0–34)
CHLORIDE SERPL-SCNC: 104 MMOL/L (ref 98–107)
CO2 SERPL-SCNC: 22 MMOL/L (ref 21–32)
CREAT SERPL-MCNC: 0.88 MG/DL (ref 0.5–1.05)
ERYTHROCYTE [DISTWIDTH] IN BLOOD BY AUTOMATED COUNT: 12.6 % (ref 11.5–14.5)
GFR SERPL CREATININE-BSD FRML MDRD: >90 ML/MIN/1.73M*2
GLUCOSE SERPL-MCNC: 75 MG/DL (ref 74–99)
HCT VFR BLD AUTO: 29.3 % (ref 36–46)
HGB BLD-MCNC: 10.2 G/DL (ref 12–16)
LDH SERPL L TO P-CCNC: 391 U/L (ref 84–246)
MAGNESIUM SERPL-MCNC: 3.43 MG/DL (ref 1.6–2.4)
MCH RBC QN AUTO: 29.3 PG (ref 26–34)
MCHC RBC AUTO-ENTMCNC: 34.8 G/DL (ref 32–36)
MCV RBC AUTO: 84 FL (ref 80–100)
NRBC BLD-RTO: 0.1 /100 WBCS (ref 0–0)
PLATELET # BLD AUTO: 93 X10*3/UL (ref 150–450)
PMV BLD AUTO: 11.9 FL (ref 7.5–11.5)
POTASSIUM SERPL-SCNC: 4.8 MMOL/L (ref 3.5–5.3)
PROT SERPL-MCNC: 5.9 G/DL (ref 6.4–8.2)
RBC # BLD AUTO: 3.48 X10*6/UL (ref 4–5.2)
SODIUM SERPL-SCNC: 134 MMOL/L (ref 136–145)
TSH SERPL-ACNC: 2.12 MIU/L (ref 0.44–3.98)
WBC # BLD AUTO: 14.1 X10*3/UL (ref 4.4–11.3)

## 2023-10-28 PROCEDURE — 99232 SBSQ HOSP IP/OBS MODERATE 35: CPT | Performed by: STUDENT IN AN ORGANIZED HEALTH CARE EDUCATION/TRAINING PROGRAM

## 2023-10-28 PROCEDURE — 83880 ASSAY OF NATRIURETIC PEPTIDE: CPT | Performed by: STUDENT IN AN ORGANIZED HEALTH CARE EDUCATION/TRAINING PROGRAM

## 2023-10-28 PROCEDURE — 83735 ASSAY OF MAGNESIUM: CPT | Performed by: NURSE PRACTITIONER

## 2023-10-28 PROCEDURE — 71275 CT ANGIOGRAPHY CHEST: CPT

## 2023-10-28 PROCEDURE — 83615 LACTATE (LD) (LDH) ENZYME: CPT | Performed by: STUDENT IN AN ORGANIZED HEALTH CARE EDUCATION/TRAINING PROGRAM

## 2023-10-28 PROCEDURE — 96372 THER/PROPH/DIAG INJ SC/IM: CPT | Performed by: STUDENT IN AN ORGANIZED HEALTH CARE EDUCATION/TRAINING PROGRAM

## 2023-10-28 PROCEDURE — 85027 COMPLETE CBC AUTOMATED: CPT | Performed by: STUDENT IN AN ORGANIZED HEALTH CARE EDUCATION/TRAINING PROGRAM

## 2023-10-28 PROCEDURE — 36415 COLL VENOUS BLD VENIPUNCTURE: CPT | Performed by: STUDENT IN AN ORGANIZED HEALTH CARE EDUCATION/TRAINING PROGRAM

## 2023-10-28 PROCEDURE — 2500000004 HC RX 250 GENERAL PHARMACY W/ HCPCS (ALT 636 FOR OP/ED): Performed by: STUDENT IN AN ORGANIZED HEALTH CARE EDUCATION/TRAINING PROGRAM

## 2023-10-28 PROCEDURE — 80053 COMPREHEN METABOLIC PANEL: CPT | Performed by: STUDENT IN AN ORGANIZED HEALTH CARE EDUCATION/TRAINING PROGRAM

## 2023-10-28 PROCEDURE — 93010 ELECTROCARDIOGRAM REPORT: CPT | Performed by: INTERNAL MEDICINE

## 2023-10-28 PROCEDURE — 84484 ASSAY OF TROPONIN QUANT: CPT | Performed by: STUDENT IN AN ORGANIZED HEALTH CARE EDUCATION/TRAINING PROGRAM

## 2023-10-28 PROCEDURE — 2550000001 HC RX 255 CONTRASTS: Performed by: STUDENT IN AN ORGANIZED HEALTH CARE EDUCATION/TRAINING PROGRAM

## 2023-10-28 PROCEDURE — 71275 CT ANGIOGRAPHY CHEST: CPT | Performed by: RADIOLOGY

## 2023-10-28 PROCEDURE — 2500000005 HC RX 250 GENERAL PHARMACY W/O HCPCS: Performed by: STUDENT IN AN ORGANIZED HEALTH CARE EDUCATION/TRAINING PROGRAM

## 2023-10-28 PROCEDURE — 84443 ASSAY THYROID STIM HORMONE: CPT | Performed by: STUDENT IN AN ORGANIZED HEALTH CARE EDUCATION/TRAINING PROGRAM

## 2023-10-28 PROCEDURE — 1210000001 HC SEMI-PRIVATE ROOM DAILY

## 2023-10-28 RX ORDER — HEPARIN SODIUM 5000 [USP'U]/ML
5000 INJECTION, SOLUTION INTRAVENOUS; SUBCUTANEOUS EVERY 8 HOURS
Status: DISCONTINUED | OUTPATIENT
Start: 2023-10-28 | End: 2023-10-31 | Stop reason: HOSPADM

## 2023-10-28 RX ADMIN — NIFEDIPINE 90 MG: 90 TABLET, FILM COATED, EXTENDED RELEASE ORAL at 06:38

## 2023-10-28 RX ADMIN — HEPARIN SODIUM 5000 UNITS: 5000 INJECTION INTRAVENOUS; SUBCUTANEOUS at 10:42

## 2023-10-28 RX ADMIN — HEPARIN SODIUM 5000 UNITS: 5000 INJECTION INTRAVENOUS; SUBCUTANEOUS at 18:38

## 2023-10-28 RX ADMIN — LIDOCAINE 1 PATCH: 4 PATCH TOPICAL at 14:51

## 2023-10-28 RX ADMIN — IOHEXOL 63 ML: 350 INJECTION, SOLUTION INTRAVENOUS at 11:40

## 2023-10-28 ASSESSMENT — PAIN SCALES - GENERAL
PAINLEVEL_OUTOF10: 8
PAINLEVEL_OUTOF10: 6

## 2023-10-28 NOTE — PROGRESS NOTES
Postpartum Progress Note    Assessment/Plan   Terra Mathew is a 27 y.o., , who delivered at 28w2d gestation and is now postpartum day 3, following urgent  delivery for severe preeclampsia with nonreassuring fetal status.    Active Problems:    Severe pre-eclampsia in third trimester    Hemolysis, elevated liver enzymes, and low platelet (HELLP) syndrome during pregnancy in third trimester      Hospital course:   Admission for severe preeclampsia diagnosed by persistent severe range BP values requiring IV antihypertensive therapy   section delivery for nonreassuring fetal status.  Patient is not breastfeedingThe patient's blood type is O POS. The baby's blood type is A POS . Rhogam is not indicated.    Clinically well overall with appropriate postoperative course, with laboratory indicators of HELLP syndrome largely improving, however, with persistent asymptomatic tachycardia as high as 140 BPM.  VS otherwise wnl and subjectively well.  Given unexplained tachycardia, however, ddx includes, though is not limited to, acute blood loss anemia, PE, cardiomyopathy, under resuscitation, infection, thyroid dysfunction.  Evaluation to include CBC, CMP, TSH, BNP, and troponin.  Assuming all are within normal limits, would proceed next with CT angiography to assess for pulmonary embolism.    Will transfer to postpartum medically complicated service.    Danielle Willis MD     Subjective   Her pain is moderately controlled with current medications  She is passing flatus  She is ambulating well  She is tolerating a Adult diet Regular  She denies emotional concerns today        Consulted by PHILLIP Gentile for persistent tachycardia of unexplained etiology.  On arrival to bedside, patient seated in bed reporting feeling well.  She recalls feeling a transient sensation of racing heart rate yesterday but affirms that she currently does not note palpitations or sensation of rapid heartbeat.  She denies chest pain,  shortness of breath, lightheadedness.  She has been hesitant to use narcotic pain medications and acetaminophen and ibuprofen are held due to transaminitis and SKYLER, so she has limited analgesia to lidoderm patch.  She feels this is sufficient for now and declines offer for alternate pain regimen.    Objective   Allergies:   Aspirin         Last Vitals:  Temp Pulse Resp BP MAP Pulse Ox   37.3 °C (99.1 °F) (!) 121 16 122/78   100 %     Vitals Min/Max Last 24 Hours:  Temp  Min: 37 °C (98.6 °F)  Max: 37.6 °C (99.7 °F)  Pulse  Min: 121  Max: 140  Resp  Min: 16  Max: 18  BP  Min: 104/63  Max: 137/91    Intake/Output:     Intake/Output Summary (Last 24 hours) at 10/29/2023 0610  Last data filed at 10/29/2023 0500  Gross per 24 hour   Intake 800 ml   Output 4300 ml   Net -3500 ml       Physical Exam:  General: Examination reveals a well developed, well nourished, female, in no acute distress. She is alert and cooperative.  HEENT: External ears normal. Nose normal, no erythema or discharge. Mouth and throat clear.  Lungs: clear to auscultation bilaterally.  Cardiac: tachycardia with regular rhythm.  Abdomen: soft, nontender, nondistended, normal bowel sounds present.  Extremities: no redness or tenderness in the calves or thighs, no edema, ambulates independently to restroom.  Neurological: alert, oriented, normal speech, no focal findings or movement disorder noted.  Psychological: awake and alert; oriented to person, place, and time.      Lab Data for 10/27/23:  Cr 1.14 (1.0 on 10/26)  AST/ALT 77/76 (209/109 on 10/26)  H/H 11.1/32.4 (12.4/39.0 on 10.26)  Plt 79 (109 on 10/26)

## 2023-10-28 NOTE — CARE PLAN
The patient's goals for the shift include      The clinical goals for the shift include maintain BP WNL

## 2023-10-29 PROBLEM — Z34.90 PREGNANCY (HHS-HCC): Status: RESOLVED | Noted: 2023-10-05 | Resolved: 2023-10-29

## 2023-10-29 PROBLEM — O14.23: Status: ACTIVE | Noted: 2023-10-29

## 2023-10-29 LAB
ALBUMIN SERPL BCP-MCNC: 2.8 G/DL (ref 3.4–5)
ALP SERPL-CCNC: 104 U/L (ref 33–110)
ALT SERPL W P-5'-P-CCNC: 102 U/L (ref 7–45)
ANION GAP SERPL CALC-SCNC: 11 MMOL/L (ref 10–20)
AST SERPL W P-5'-P-CCNC: 112 U/L (ref 9–39)
BILIRUB SERPL-MCNC: 0.3 MG/DL (ref 0–1.2)
BLOOD EXPIRATION DATE: NORMAL
BLOOD EXPIRATION DATE: NORMAL
BUN SERPL-MCNC: 11 MG/DL (ref 6–23)
CALCIUM SERPL-MCNC: 8.3 MG/DL (ref 8.6–10.6)
CHLORIDE SERPL-SCNC: 108 MMOL/L (ref 98–107)
CO2 SERPL-SCNC: 22 MMOL/L (ref 21–32)
CREAT SERPL-MCNC: 0.76 MG/DL (ref 0.5–1.05)
DISPENSE STATUS: NORMAL
DISPENSE STATUS: NORMAL
ERYTHROCYTE [DISTWIDTH] IN BLOOD BY AUTOMATED COUNT: 13.5 % (ref 11.5–14.5)
GFR SERPL CREATININE-BSD FRML MDRD: >90 ML/MIN/1.73M*2
GLUCOSE SERPL-MCNC: 90 MG/DL (ref 74–99)
HCT VFR BLD AUTO: 27.5 % (ref 36–46)
HGB BLD-MCNC: 8.9 G/DL (ref 12–16)
MCH RBC QN AUTO: 29.3 PG (ref 26–34)
MCHC RBC AUTO-ENTMCNC: 32.4 G/DL (ref 32–36)
MCV RBC AUTO: 91 FL (ref 80–100)
NRBC BLD-RTO: 0.2 /100 WBCS (ref 0–0)
PLATELET # BLD AUTO: 101 X10*3/UL (ref 150–450)
PMV BLD AUTO: 11.5 FL (ref 7.5–11.5)
POTASSIUM SERPL-SCNC: 4.4 MMOL/L (ref 3.5–5.3)
PRODUCT BLOOD TYPE: 5100
PRODUCT BLOOD TYPE: 5100
PRODUCT CODE: NORMAL
PRODUCT CODE: NORMAL
PROT SERPL-MCNC: 5.4 G/DL (ref 6.4–8.2)
RBC # BLD AUTO: 3.04 X10*6/UL (ref 4–5.2)
SODIUM SERPL-SCNC: 137 MMOL/L (ref 136–145)
UNIT ABO: NORMAL
UNIT ABO: NORMAL
UNIT NUMBER: NORMAL
UNIT NUMBER: NORMAL
UNIT RH: NORMAL
UNIT RH: NORMAL
UNIT VOLUME: 350
UNIT VOLUME: 350
WBC # BLD AUTO: 11 X10*3/UL (ref 4.4–11.3)
XM INTEP: NORMAL
XM INTEP: NORMAL

## 2023-10-29 PROCEDURE — 96372 THER/PROPH/DIAG INJ SC/IM: CPT | Performed by: STUDENT IN AN ORGANIZED HEALTH CARE EDUCATION/TRAINING PROGRAM

## 2023-10-29 PROCEDURE — 85027 COMPLETE CBC AUTOMATED: CPT

## 2023-10-29 PROCEDURE — 2500000004 HC RX 250 GENERAL PHARMACY W/ HCPCS (ALT 636 FOR OP/ED): Performed by: STUDENT IN AN ORGANIZED HEALTH CARE EDUCATION/TRAINING PROGRAM

## 2023-10-29 PROCEDURE — 36415 COLL VENOUS BLD VENIPUNCTURE: CPT

## 2023-10-29 PROCEDURE — 2500000004 HC RX 250 GENERAL PHARMACY W/ HCPCS (ALT 636 FOR OP/ED): Performed by: NURSE PRACTITIONER

## 2023-10-29 PROCEDURE — 80053 COMPREHEN METABOLIC PANEL: CPT

## 2023-10-29 PROCEDURE — 1210000001 HC SEMI-PRIVATE ROOM DAILY

## 2023-10-29 PROCEDURE — 2500000001 HC RX 250 WO HCPCS SELF ADMINISTERED DRUGS (ALT 637 FOR MEDICARE OP): Performed by: STUDENT IN AN ORGANIZED HEALTH CARE EDUCATION/TRAINING PROGRAM

## 2023-10-29 RX ORDER — IBUPROFEN 600 MG/1
600 TABLET ORAL EVERY 6 HOURS SCHEDULED
Status: DISCONTINUED | OUTPATIENT
Start: 2023-10-29 | End: 2023-10-31 | Stop reason: HOSPADM

## 2023-10-29 RX ADMIN — HEPARIN SODIUM 5000 UNITS: 5000 INJECTION INTRAVENOUS; SUBCUTANEOUS at 02:08

## 2023-10-29 RX ADMIN — SODIUM CHLORIDE, POTASSIUM CHLORIDE, SODIUM LACTATE AND CALCIUM CHLORIDE 1000 ML: 600; 310; 30; 20 INJECTION, SOLUTION INTRAVENOUS at 17:26

## 2023-10-29 RX ADMIN — IBUPROFEN 600 MG: 600 TABLET, FILM COATED ORAL at 08:37

## 2023-10-29 RX ADMIN — NIFEDIPINE 90 MG: 90 TABLET, FILM COATED, EXTENDED RELEASE ORAL at 06:03

## 2023-10-29 ASSESSMENT — PAIN SCALES - GENERAL
PAINLEVEL_OUTOF10: 7
PAINLEVEL_OUTOF10: 6
PAINLEVEL_OUTOF10: 5 - MODERATE PAIN
PAINLEVEL_OUTOF10: 0 - NO PAIN

## 2023-10-29 ASSESSMENT — PAIN DESCRIPTION - DESCRIPTORS: DESCRIPTORS: SORE

## 2023-10-29 NOTE — PROGRESS NOTES
Postpartum Progress Note    Assessment/Plan   Terra Mathew is a 27 y.o., , who delivered at 28w2d gestation and is now postpartum day 3, following urgent  delivery for severe preeclampsia with nonreassuring fetal status.    Active Problems:    Severe pre-eclampsia in third trimester    Hemolysis, elevated liver enzymes, and low platelet (HELLP) syndrome during pregnancy in third trimester    Routine postpartum care  - meeting all postpartum and post-op milestones  - voiding spontaneously, tolerating PO diet  - pain well controlled on po medications (will restart NSAIDs now that SKYLER resolved)  - DVT Score: 5 - encourage ambulation and  SCDs, ppx lovenox held d/t plt 90s    Tachycardia  - HR 120s-140s on 10/28-29    - asympt  - neg CT PE  - labs improving from previously, Hgb stable  - will consult cardiology for recs     sPEC, c/f HELLP syndrome  - dx'd based on severe-range BPs requiring IV antihypertensives  - no IV antihypertensives required overnight  - Nifedipine 90 mg daily, BPs currently normotensive  - HELLP labs notable for:              - ALT/AST: 124/192 -> 145/302 -> 120/279 -> 109/209 -> 76/77 -> 51/36              - Cr 1.15 -> 1.10 -> 1.03 -> 1.00 -> 1.14 -> 0.88              - Plt 205 -> 109 -> 118 -> 91 -> 79 -> 93              - LDH on admission 555  - s/p Mg gtt  - overall pt clinically stable this AM, continue to monitor closely     Dispo: transfer to postpartum service for continued care  - for f/u 1 week for BP check, 2 weeks for incision check, and 1 month with Primary OB provider    Maida Francis MD     Subjective   Her pain is moderately controlled with current medications  She is passing flatus  She is ambulating well  She is tolerating a Adult diet Regular  She denies emotional concerns today       Objective   Allergies:   Aspirin         Last Vitals:  Temp Pulse Resp BP MAP Pulse Ox   37.3 °C (99.1 °F) (!) 121 16 122/78   100 %     Vitals Min/Max Last 24 Hours:  Temp   Min: 37 °C (98.6 °F)  Max: 37.6 °C (99.7 °F)  Pulse  Min: 121  Max: 140  Resp  Min: 16  Max: 18  BP  Min: 104/63  Max: 137/91    Intake/Output:     Intake/Output Summary (Last 24 hours) at 10/29/2023 0752  Last data filed at 10/29/2023 0500  Gross per 24 hour   Intake 800 ml   Output 4300 ml   Net -3500 ml       Physical Exam:  General: Examination reveals a well developed, well nourished, female, in no acute distress. She is alert and cooperative.  HEENT: External ears normal. Nose normal, no erythema or discharge. Mouth and throat clear.  Lungs: clear to auscultation bilaterally.  Cardiac: tachycardia with regular rhythm.  Abdomen: soft, nontender, nondistended, normal bowel sounds present.  Extremities: no redness or tenderness in the calves or thighs, no edema, ambulates independently to restroom.  Neurological: alert, oriented, normal speech, no focal findings or movement disorder noted.  Psychological: awake and alert; oriented to person, place, and time.      Results for orders placed or performed during the hospital encounter of 10/26/23 (from the past 96 hour(s))   Type And Screen   Result Value Ref Range    ABO TYPE O     Rh TYPE POS     ANTIBODY SCREEN NEG    CBC   Result Value Ref Range    WBC 10.9 4.4 - 11.3 x10*3/uL    nRBC 0.0 0.0 - 0.0 /100 WBCs    RBC 4.47 4.00 - 5.20 x10*6/uL    Hemoglobin 13.1 12.0 - 16.0 g/dL    Hematocrit 39.4 36.0 - 46.0 %    MCV 88 80 - 100 fL    MCH 29.3 26.0 - 34.0 pg    MCHC 33.2 32.0 - 36.0 g/dL    RDW 12.0 11.5 - 14.5 %    Platelets 205 150 - 450 x10*3/uL    MPV 11.4 7.5 - 11.5 fL   Lactate Dehydrogenase   Result Value Ref Range     (H) 84 - 246 U/L   Comprehensive Metabolic Panel   Result Value Ref Range    Glucose 83 74 - 99 mg/dL    Sodium 139 136 - 145 mmol/L    Potassium 4.5 3.5 - 5.3 mmol/L    Chloride 105 98 - 107 mmol/L    Bicarbonate 21 21 - 32 mmol/L    Anion Gap 18 10 - 20 mmol/L    Urea Nitrogen 18 6 - 23 mg/dL    Creatinine 1.15 (H) 0.50 - 1.05 mg/dL     eGFR 67 >60 mL/min/1.73m*2    Calcium 9.3 8.6 - 10.6 mg/dL    Albumin 3.3 (L) 3.4 - 5.0 g/dL    Alkaline Phosphatase 142 (H) 33 - 110 U/L    Total Protein 6.4 6.4 - 8.2 g/dL     (H) 9 - 39 U/L    Bilirubin, Total 0.7 0.0 - 1.2 mg/dL     (H) 7 - 45 U/L   CBC Anemia Panel with Reflex, Pregnancy   Result Value Ref Range    Reflex added, Anemia panel     Rubella Antibody, IgG   Result Value Ref Range    Rubella, IgG Positive Negative    Rubella, IgG Index 2.3 <=0.7 IA IA   Syphilis Screen with Reflex   Result Value Ref Range    Syphilis Total Ab Nonreactive Nonreactive   Hepatitis B Surface Antigen   Result Value Ref Range    Hepatitis B Surface AG Nonreactive Nonreactive   Hepatitis C Antibody   Result Value Ref Range    Hepatitis C AB Nonreactive Nonreactive   Coagulation Screen   Result Value Ref Range    Protime 9.7 (L) 9.8 - 12.8 seconds    INR 0.9 0.9 - 1.1    aPTT 26 (L) 27 - 38 seconds   Fibrinogen   Result Value Ref Range    Fibrinogen 531 (H) 200 - 400 mg/dL   Type and screen   Result Value Ref Range    ABO TYPE O     Rh TYPE POS     ANTIBODY SCREEN NEG    Prepare RBC: 2 Units   Result Value Ref Range    PRODUCT CODE Y0014R04     Unit Number [id number]     Unit ABO O     Unit RH POS     XM INTEP COMP     Dispense Status XM     Blood Expiration Date November 12, 2023 23:59 EST     PRODUCT BLOOD TYPE 5100     UNIT VOLUME 350     PRODUCT CODE S5595R39     Unit Number [id number]     Unit ABO O     Unit RH POS     XM INTEP COMP     Dispense Status XM     Blood Expiration Date November 12, 2023 23:59 EST     PRODUCT BLOOD TYPE 5100     UNIT VOLUME 350    Comprehensive Metabolic Panel   Result Value Ref Range    Glucose 90 74 - 99 mg/dL    Sodium 137 136 - 145 mmol/L    Potassium 4.4 3.5 - 5.3 mmol/L    Chloride 103 98 - 107 mmol/L    Bicarbonate 18 (L) 21 - 32 mmol/L    Anion Gap 20 10 - 20 mmol/L    Urea Nitrogen 17 6 - 23 mg/dL    Creatinine 1.10 (H) 0.50 - 1.05 mg/dL    eGFR 71 >60  mL/min/1.73m*2    Calcium 9.2 8.6 - 10.6 mg/dL    Albumin 3.3 (L) 3.4 - 5.0 g/dL    Alkaline Phosphatase 156 (H) 33 - 110 U/L    Total Protein 6.5 6.4 - 8.2 g/dL     (H) 9 - 39 U/L    Bilirubin, Total 1.2 0.0 - 1.2 mg/dL     (H) 7 - 45 U/L   CBC   Result Value Ref Range    WBC 18.5 (H) 4.4 - 11.3 x10*3/uL    nRBC 0.0 0.0 - 0.0 /100 WBCs    RBC 4.86 4.00 - 5.20 x10*6/uL    Hemoglobin 14.5 12.0 - 16.0 g/dL    Hematocrit 42.9 36.0 - 46.0 %    MCV 88 80 - 100 fL    MCH 29.8 26.0 - 34.0 pg    MCHC 33.8 32.0 - 36.0 g/dL    RDW 12.5 11.5 - 14.5 %    Platelets 109 (L) 150 - 450 x10*3/uL    MPV 11.0 7.5 - 11.5 fL   CBC   Result Value Ref Range    WBC 16.5 (H) 4.4 - 11.3 x10*3/uL    nRBC 0.0 0.0 - 0.0 /100 WBCs    RBC 4.29 4.00 - 5.20 x10*6/uL    Hemoglobin 12.5 12.0 - 16.0 g/dL    Hematocrit 37.6 36.0 - 46.0 %    MCV 88 80 - 100 fL    MCH 29.1 26.0 - 34.0 pg    MCHC 33.2 32.0 - 36.0 g/dL    RDW 12.4 11.5 - 14.5 %    Platelets 118 (L) 150 - 450 x10*3/uL    MPV 10.7 7.5 - 11.5 fL   Comprehensive metabolic panel   Result Value Ref Range    Glucose 156 (H) 74 - 99 mg/dL    Sodium 134 (L) 136 - 145 mmol/L    Potassium 4.7 3.5 - 5.3 mmol/L    Chloride 104 98 - 107 mmol/L    Bicarbonate 17 (L) 21 - 32 mmol/L    Anion Gap 18 10 - 20 mmol/L    Urea Nitrogen 19 6 - 23 mg/dL    Creatinine 1.03 0.50 - 1.05 mg/dL    eGFR 77 >60 mL/min/1.73m*2    Calcium 8.4 (L) 8.6 - 10.6 mg/dL    Albumin 2.9 (L) 3.4 - 5.0 g/dL    Alkaline Phosphatase 131 (H) 33 - 110 U/L    Total Protein 5.7 (L) 6.4 - 8.2 g/dL     (H) 9 - 39 U/L    Bilirubin, Total 1.2 0.0 - 1.2 mg/dL     (H) 7 - 45 U/L   CBC   Result Value Ref Range    WBC 16.5 (H) 4.4 - 11.3 x10*3/uL    nRBC 0.0 0.0 - 0.0 /100 WBCs    RBC 4.17 4.00 - 5.20 x10*6/uL    Hemoglobin 12.4 12.0 - 16.0 g/dL    Hematocrit 39.0 36.0 - 46.0 %    MCV 94 80 - 100 fL    MCH 29.7 26.0 - 34.0 pg    MCHC 31.8 (L) 32.0 - 36.0 g/dL    RDW 12.6 11.5 - 14.5 %    Platelets 91 (L) 150 - 450  x10*3/uL    MPV 10.9 7.5 - 11.5 fL   Comprehensive metabolic panel   Result Value Ref Range    Glucose 129 (H) 74 - 99 mg/dL    Sodium 132 (L) 136 - 145 mmol/L    Potassium 5.0 3.5 - 5.3 mmol/L    Chloride 103 98 - 107 mmol/L    Bicarbonate 18 (L) 21 - 32 mmol/L    Anion Gap 16 10 - 20 mmol/L    Urea Nitrogen 21 6 - 23 mg/dL    Creatinine 1.00 0.50 - 1.05 mg/dL    eGFR 79 >60 mL/min/1.73m*2    Calcium 8.2 (L) 8.6 - 10.6 mg/dL    Albumin 2.9 (L) 3.4 - 5.0 g/dL    Alkaline Phosphatase 122 (H) 33 - 110 U/L    Total Protein 5.8 (L) 6.4 - 8.2 g/dL     (H) 9 - 39 U/L    Bilirubin, Total 1.1 0.0 - 1.2 mg/dL     (H) 7 - 45 U/L   CBC   Result Value Ref Range    WBC 17.1 (H) 4.4 - 11.3 x10*3/uL    nRBC 0.0 0.0 - 0.0 /100 WBCs    RBC 3.81 (L) 4.00 - 5.20 x10*6/uL    Hemoglobin 11.1 (L) 12.0 - 16.0 g/dL    Hematocrit 32.4 (L) 36.0 - 46.0 %    MCV 85 80 - 100 fL    MCH 29.1 26.0 - 34.0 pg    MCHC 34.3 32.0 - 36.0 g/dL    RDW 12.8 11.5 - 14.5 %    Platelets 79 (L) 150 - 450 x10*3/uL    MPV 12.2 (H) 7.5 - 11.5 fL   Comprehensive Metabolic Panel   Result Value Ref Range    Glucose 106 (H) 74 - 99 mg/dL    Sodium 128 (L) 136 - 145 mmol/L    Potassium 4.2 3.5 - 5.3 mmol/L    Chloride 98 98 - 107 mmol/L    Bicarbonate 21 21 - 32 mmol/L    Anion Gap 13 10 - 20 mmol/L    Urea Nitrogen 22 6 - 23 mg/dL    Creatinine 1.14 (H) 0.50 - 1.05 mg/dL    eGFR 68 >60 mL/min/1.73m*2    Calcium 7.6 (L) 8.6 - 10.6 mg/dL    Albumin 2.7 (L) 3.4 - 5.0 g/dL    Alkaline Phosphatase 111 (H) 33 - 110 U/L    Total Protein 5.4 (L) 6.4 - 8.2 g/dL    AST 77 (H) 9 - 39 U/L    Bilirubin, Total 0.5 0.0 - 1.2 mg/dL    ALT 76 (H) 7 - 45 U/L   CBC   Result Value Ref Range    WBC 14.1 (H) 4.4 - 11.3 x10*3/uL    nRBC 0.1 (H) 0.0 - 0.0 /100 WBCs    RBC 3.48 (L) 4.00 - 5.20 x10*6/uL    Hemoglobin 10.2 (L) 12.0 - 16.0 g/dL    Hematocrit 29.3 (L) 36.0 - 46.0 %    MCV 84 80 - 100 fL    MCH 29.3 26.0 - 34.0 pg    MCHC 34.8 32.0 - 36.0 g/dL    RDW 12.6 11.5 -  14.5 %    Platelets 93 (L) 150 - 450 x10*3/uL    MPV 11.9 (H) 7.5 - 11.5 fL   Comprehensive metabolic panel   Result Value Ref Range    Glucose 75 74 - 99 mg/dL    Sodium 134 (L) 136 - 145 mmol/L    Potassium 4.8 3.5 - 5.3 mmol/L    Chloride 104 98 - 107 mmol/L    Bicarbonate 22 21 - 32 mmol/L    Anion Gap 13 10 - 20 mmol/L    Urea Nitrogen 18 6 - 23 mg/dL    Creatinine 0.88 0.50 - 1.05 mg/dL    eGFR >90 >60 mL/min/1.73m*2    Calcium 7.7 (L) 8.6 - 10.6 mg/dL    Albumin 2.8 (L) 3.4 - 5.0 g/dL    Alkaline Phosphatase 96 33 - 110 U/L    Total Protein 5.9 (L) 6.4 - 8.2 g/dL    AST 36 9 - 39 U/L    Bilirubin, Total 0.4 0.0 - 1.2 mg/dL    ALT 51 (H) 7 - 45 U/L   Lactate dehydrogenase   Result Value Ref Range     (H) 84 - 246 U/L   Magnesium   Result Value Ref Range    Magnesium 3.43 (H) 1.60 - 2.40 mg/dL   Troponin I, High Sensitivity   Result Value Ref Range    Troponin I, High Sensitivity 11 0 - 34 ng/L   TSH   Result Value Ref Range    Thyroid Stimulating Hormone 2.12 0.44 - 3.98 mIU/L   B-type Natriuretic Peptide   Result Value Ref Range    BNP 27 0 - 99 pg/mL

## 2023-10-29 NOTE — CARE PLAN
Problem: Postpartum  Goal: Experiences normal postpartum course  Outcome: Progressing  Goal: Appropriate maternal -  bonding  Outcome: Progressing  Goal: Establish and maintain infant feeding pattern for adequate nutrition  Outcome: Progressing  Goal: Incisions, wounds, or drain sites healing without S/S of infection  Outcome: Progressing  Goal: No s/sx infection  Outcome: Progressing  Goal: No s/sx of hemorrhage  Outcome: Progressing  Goal: Minimal s/sx of HDP and BP<160/110  Outcome: Progressing     Problem:  Recovery Care  Goal: Verbalizes understanding of post-op instructions  Outcome: Progressing  Goal: Manages discomfort  Outcome: Progressing  Goal: Dressing intact until removed with any drainage marked  Outcome: Progressing  Goal: Patient vital signs are stable  Outcome: Progressing  Goal: Urine output is 0.5 mL/kg/hr or more  Outcome: Progressing  Goal: Fundus firm at midline  Outcome: Progressing     Problem: Hypertensive Disorder of Pregnancy (HDP)  Goal: Minimal s/sx of HDP and BP<160/110  Outcome: Progressing  Goal: Adequate urine output (0.5 ml/kg/hr)  Outcome: Progressing     Problem: Pain - Adult  Goal: Verbalizes/displays adequate comfort level or baseline comfort level  Outcome: Progressing     Problem: Safety - Adult  Goal: Free from fall injury  Outcome: Progressing   The patient's goals for the shift include go see my baby.    The clinical goals for the shift include BP.'s will remain below 160/100 today.  Pt. Continues to be tachycardic .SHE denies s/sx of PEC.She is only needing to take motrin for her pain.She continues on nifedipine XL as ordered.Labs were were done this am.Her urine output is good.Stable.She will stay one more day.Stable.Her assessments are WNL.

## 2023-10-30 ENCOUNTER — ANESTHESIA EVENT (OUTPATIENT)
Dept: ANESTHESIOLOGY | Facility: HOSPITAL | Age: 28
End: 2023-10-30
Payer: COMMERCIAL

## 2023-10-30 ENCOUNTER — PHARMACY VISIT (OUTPATIENT)
Dept: PHARMACY | Facility: CLINIC | Age: 28
End: 2023-10-30
Payer: MEDICAID

## 2023-10-30 LAB
ERYTHROCYTE [DISTWIDTH] IN BLOOD BY AUTOMATED COUNT: 13.2 % (ref 11.5–14.5)
HCT VFR BLD AUTO: 28.4 % (ref 36–46)
HGB BLD-MCNC: 9 G/DL (ref 12–16)
HIV 1+2 AB+HIV1 P24 AG SERPL QL IA: NONREACTIVE
MCH RBC QN AUTO: 29 PG (ref 26–34)
MCHC RBC AUTO-ENTMCNC: 31.7 G/DL (ref 32–36)
MCV RBC AUTO: 92 FL (ref 80–100)
NRBC BLD-RTO: 0.2 /100 WBCS (ref 0–0)
PLATELET # BLD AUTO: 132 X10*3/UL (ref 150–450)
PMV BLD AUTO: 11.4 FL (ref 7.5–11.5)
RBC # BLD AUTO: 3.1 X10*6/UL (ref 4–5.2)
WBC # BLD AUTO: 9.3 X10*3/UL (ref 4.4–11.3)

## 2023-10-30 PROCEDURE — 99231 SBSQ HOSP IP/OBS SF/LOW 25: CPT | Performed by: NURSE PRACTITIONER

## 2023-10-30 PROCEDURE — 96372 THER/PROPH/DIAG INJ SC/IM: CPT | Performed by: STUDENT IN AN ORGANIZED HEALTH CARE EDUCATION/TRAINING PROGRAM

## 2023-10-30 PROCEDURE — 85027 COMPLETE CBC AUTOMATED: CPT | Performed by: NURSE PRACTITIONER

## 2023-10-30 PROCEDURE — 2500000005 HC RX 250 GENERAL PHARMACY W/O HCPCS: Performed by: STUDENT IN AN ORGANIZED HEALTH CARE EDUCATION/TRAINING PROGRAM

## 2023-10-30 PROCEDURE — 2500000004 HC RX 250 GENERAL PHARMACY W/ HCPCS (ALT 636 FOR OP/ED): Performed by: STUDENT IN AN ORGANIZED HEALTH CARE EDUCATION/TRAINING PROGRAM

## 2023-10-30 PROCEDURE — RXMED WILLOW AMBULATORY MEDICATION CHARGE

## 2023-10-30 PROCEDURE — 1210000001 HC SEMI-PRIVATE ROOM DAILY

## 2023-10-30 PROCEDURE — 36415 COLL VENOUS BLD VENIPUNCTURE: CPT | Performed by: NURSE PRACTITIONER

## 2023-10-30 RX ORDER — NIFEDIPINE 90 MG/1
90 TABLET, EXTENDED RELEASE ORAL
Qty: 30 TABLET | Refills: 1 | Status: SHIPPED | OUTPATIENT
Start: 2023-10-31 | End: 2023-12-30

## 2023-10-30 RX ORDER — IBUPROFEN 600 MG/1
600 TABLET ORAL EVERY 6 HOURS PRN
Qty: 120 TABLET | Refills: 0 | Status: SHIPPED | OUTPATIENT
Start: 2023-10-30 | End: 2023-11-29

## 2023-10-30 RX ORDER — ACETAMINOPHEN 500 MG
1000 TABLET ORAL EVERY 6 HOURS PRN
Qty: 120 TABLET | Refills: 0 | Status: SHIPPED | OUTPATIENT
Start: 2023-10-30

## 2023-10-30 RX ORDER — DOCUSATE SODIUM 100 MG/1
100 CAPSULE, LIQUID FILLED ORAL 2 TIMES DAILY PRN
Qty: 60 CAPSULE | Refills: 0 | Status: SHIPPED | OUTPATIENT
Start: 2023-10-30 | End: 2023-11-29

## 2023-10-30 RX ADMIN — HEPARIN SODIUM 5000 UNITS: 5000 INJECTION INTRAVENOUS; SUBCUTANEOUS at 02:08

## 2023-10-30 RX ADMIN — NIFEDIPINE 90 MG: 90 TABLET, FILM COATED, EXTENDED RELEASE ORAL at 07:16

## 2023-10-30 RX ADMIN — HEPARIN SODIUM 5000 UNITS: 5000 INJECTION INTRAVENOUS; SUBCUTANEOUS at 11:14

## 2023-10-30 RX ADMIN — LIDOCAINE 1 PATCH: 4 PATCH TOPICAL at 08:44

## 2023-10-30 ASSESSMENT — PAIN SCALES - GENERAL
PAINLEVEL_OUTOF10: 6
PAINLEVEL_OUTOF10: 6

## 2023-10-30 ASSESSMENT — PAIN DESCRIPTION - DESCRIPTORS: DESCRIPTORS: SORE

## 2023-10-30 NOTE — CARE PLAN
The patient's goals for the shift include to be able to go homer    The clinical goals for the shift include ambulate more to the nicu    Over the shift, the patient did not make progress toward the following goals. Barriers to progression include ***. Recommendations to address these barriers include ***.    Problem: Postpartum  Goal: Appropriate maternal -  bonding  Outcome: Not Progressing  Goal: Establish and maintain infant feeding pattern for adequate nutrition  Outcome: Not Progressing

## 2023-10-30 NOTE — CARE PLAN
The patient's goals for the shift include to be done with these fluids    The clinical goals for the shift include for patient to shower and finish birth certificate    Patient got 1L fluid bolus last night which has improved her tachycardia. Patient has not showered or finished birth certificate yet but was encouraged to do both. Patient did not require pain medication overnight. Will continue to monitor for any changes.

## 2023-10-30 NOTE — DISCHARGE SUMMARY
Discharge Summary    Admission Date: 10/26/2023  Discharge Date: 10/30/23  Discharge Diagnosis: sPEC, LTCS, HELLP    Patient Active Problem List   Diagnosis    Disorder of refraction and accommodation    Exertional asthma    Lipoprotein deficiency disorder    Major depressive disorder    Schizophrenia (CMS/HCC)    Secondhand smoke exposure    Academic skill disorder    Severe pre-eclampsia in third trimester    Hemolysis, elevated liver enzymes, and low platelet (HELLP) syndrome during pregnancy in third trimester       Hospital Course  Admission Date: 10/26/2023    Delivery Date: 10/26/2023  4:31 AM     Delivery type: , Low Transverse      GA at delivery: 28w2d    Outcome: Living     Anesthesia during delivery: General     Intrapartum complications: Fetal Intolerance     Feeding method: Breastfeeding Status: No    Contraception: interval patch    The patient's blood type is O POS. The baby's blood type is A POS . Rhogam is not indicated.     Terra Mathew is a 27 y.o., , who was admitted on 10/26/2023 and had a , Low Transverse  delivery on 10/26/2023  at 28w2d and is now postpartum day 4.    Hospital course n/f:     sPEC and HELLP syndrome  - dx'd based on severe-range BPs requiring IV antihypertensives  - Nifedipine 90 mg daily, BPs currently normotensive  - HELLP labs notable for:              - ALT/AST: 124/192 -> 145/302 -> 120/279 -> 109/209 -> 76/77 -> 51/36              - Cr 1.15 -> 1.10 -> 1.03 -> 1.00 -> 1.14 -> 0.88              - Plt 205 -> 109 -> 118 -> 91 -> 79 -> 93              - LDH on admission 555 -> 391  - s/p Mg gtt  - asx    Tachycardia  - HR 120s-140s on 10/28-29  EKG confirmed sinus  - cards deferred to medical management  - asympt  - neg CT PE  - Hgb stable, trop, bnp, CMP all WNL,   - improved w IVF bolus, remains low tachy    She is meeting all postpartum milestones- voiding spontaneously, passing flatus, ambulating independently, tolerating PO intake, and pain  is well controlled on PO meds. She denies CP, SOB, calf pain, fever, passage of large clots. OK for DC today and follow up as below.    Dispo  BP's well controlled  Tachycardia improved  Meeting all post-op/postpartum milestones  OK for DC today  - The signs and symptoms of PEC were reviewed with the patient, including unrelenting headache, vision changes/blurred vision, and RUQ pain  - BP cuff for home for checking BP twice a day  - Pt instructed to call primary OB if SBP > 160 or DBP > 110 or if development of PEC symptoms   - On discharge, follow up with primary OB in:       - 2-5 days for BP check       - 2 weeks for incision check       - 4-6 week for post-partum visit  Request placed for Mac 1200 appt 2/2 baby in NICU     Pertinent Physical Exam At Time of Discharge  General: well appearing, well-nourished, postpartum  Obstetric: fundus firm below umbilicus, lochia light  Skin: Warm, dry; no rashes/lesions/erythema; Pfannenstiel incision: clean, dry, well approximated, open to air, negative discharge/warmth/erythema   Breast: No masses, nipple discharge  Neuro: A/Ox3, conversational  GI: +BS, abdomen soft, non-tender,   Respiratory: Even and unlabored on RA, LSCTA BL  Cardiovascular: Trace BLE edema; No erythema, warmth  Psych: appropriate mood and affect       Your medication list        START taking these medications        Instructions Last Dose Given Next Dose Due   acetaminophen 500 mg tablet  Commonly known as: Tylenol      Take 2 tablets (1,000 mg) by mouth every 6 hours if needed for moderate pain (4 - 6).       docusate sodium 100 mg capsule  Commonly known as: Colace      Take 1 capsule (100 mg) by mouth 2 times a day as needed for constipation.        mg tablet  Generic drug: ibuprofen      Take 1 tablet (600 mg) by mouth every 6 hours if needed for moderate pain (4 - 6) (pain).       NIFEdipine ER 90 mg 24 hr tablet  Commonly known as: Adalat CC  Start taking on: October 31, 2023      Take 1  tablet (90 mg) by mouth once daily in the morning. Take before meals. Do not crush, chew, or split. Do not start before October 31, 2023.              CONTINUE taking these medications        Instructions Last Dose Given Next Dose Due   PRENATAL GUMMIES ORAL           Ventolin HFA 90 mcg/actuation inhaler  Generic drug: albuterol                  STOP taking these medications      PRENATAL 19 ORAL                  Where to Get Your Medications        These medications were sent to [facility]  [address] [place] [address]      Hours: 8:30 AM to 5 PM Mon-Fri Phone: [phone]   acetaminophen 500 mg tablet  docusate sodium 100 mg capsule   mg tablet  NIFEdipine ER 90 mg 24 hr tablet           Outpatient Follow-Up  Future Appointments   Date Time Provider Department Center   11/1/2023  3:45 PM VITOR Ramesh [id number] Academic       PHILLIP Decker-CNP

## 2023-10-30 NOTE — PROGRESS NOTES
KAELA notified by [facility] that patient was waiting to see social work due to transportation issues. MARTYK went into the room where mother and her mother were present. They report they both need a ride home. KAELA spoke with bedside RN who can arrange a Lyft for them home as a one time courtesy. Mother also asking about boarding. KAELA explained that she needs to be speak with her nurse on [facility] to see if there is availability but explained to mother boarding is taken day by day. Mother also stating she has no transportation to the hospital to see the baby once she does go home. Mother states she believes she has [employer]; MARTYK provided information on Provide A Ride. Mother was referred to [facility]; KAELA emailed to follow up. Mother states she does not live near a bus line or have a vehicle. She is not sure if [facility] is willing to provide transportation anymore. KAELA will continue to follow for ongoing support.     TAHIR Donald Ext [address] [facility]

## 2023-10-31 ENCOUNTER — HOSPITAL ENCOUNTER (OUTPATIENT)
Dept: CARDIOLOGY | Facility: HOSPITAL | Age: 28
Discharge: HOME | End: 2023-10-31
Payer: COMMERCIAL

## 2023-10-31 LAB
ATRIAL RATE: 142 BPM
LABORATORY COMMENT REPORT: NORMAL
P AXIS: 70 DEGREES
P OFFSET: 200 MS
P ONSET: 156 MS
PATH REPORT.FINAL DX SPEC: NORMAL
PATH REPORT.GROSS SPEC: NORMAL
PATH REPORT.RELEVANT HX SPEC: NORMAL
PATH REPORT.TOTAL CANCER: NORMAL
PR INTERVAL: 144 MS
Q ONSET: 228 MS
QRS COUNT: 23 BEATS
QRS DURATION: 64 MS
QT INTERVAL: 344 MS
QTC CALCULATION(BAZETT): 529 MS
QTC FREDERICIA: 458 MS
R AXIS: -5 DEGREES
T AXIS: 30 DEGREES
T OFFSET: 400 MS
VENTRICULAR RATE: 142 BPM

## 2023-10-31 PROCEDURE — 93005 ELECTROCARDIOGRAM TRACING: CPT

## 2023-10-31 NOTE — SIGNIFICANT EVENT
Follow-up Phone Call Note:   Interview:  Care Type: Women's Health   Phone Number Call  [phone]   Call Outcome: No Answer-not accepting calls   Date/Time Of Call: 10/31/23 at 1108  Call Back Done By: care coordinator  Callback Complete:  Yes

## 2023-11-02 ENCOUNTER — DOCUMENTATION (OUTPATIENT)
Dept: OBSTETRICS AND GYNECOLOGY | Facility: HOSPITAL | Age: 28
End: 2023-11-02

## 2023-11-09 ENCOUNTER — APPOINTMENT (OUTPATIENT)
Dept: OBSTETRICS AND GYNECOLOGY | Facility: CLINIC | Age: 28
End: 2023-11-09
Payer: COMMERCIAL

## 2023-11-09 LAB — REFLEX ADDED, ANEMIA PANEL: NORMAL

## 2023-11-10 NOTE — ANESTHESIA PROCEDURE NOTES
Airway  Date/Time: 10/26/2023 4:28 AM    Staffing  Performed: [facility]   Authorized by: Telma Lazcano MD    Performed by: Telma Lazcano MD  Patient location during procedure: OB    Final Airway Details  Final airway type: mask

## 2023-11-10 NOTE — ANESTHESIA PROCEDURE NOTES
Airway  Date/Time: 10/26/2023 4:28 AM  Urgency: emergent      Staffing  Performed: [facility]   Authorized by: Telma Lazcano MD    Performed by: Telma Lazcano MD  Patient location during procedure: OB    Indications and Patient Condition  Indications for airway management: anesthesia      Final Airway Details  Final airway type: endotracheal airway

## 2023-11-14 ENCOUNTER — POSTPARTUM VISIT (OUTPATIENT)
Dept: OBSTETRICS AND GYNECOLOGY | Facility: CLINIC | Age: 28
End: 2023-11-14
Payer: COMMERCIAL

## 2023-11-14 VITALS
HEIGHT: 63 IN | DIASTOLIC BLOOD PRESSURE: 70 MMHG | BODY MASS INDEX: 34.91 KG/M2 | WEIGHT: 197 LBS | SYSTOLIC BLOOD PRESSURE: 114 MMHG

## 2023-11-14 PROCEDURE — 0503F POSTPARTUM CARE VISIT: CPT | Performed by: OBSTETRICS & GYNECOLOGY

## 2023-11-14 ASSESSMENT — EDINBURGH POSTNATAL DEPRESSION SCALE (EPDS)
THE THOUGHT OF HARMING MYSELF HAS OCCURRED TO ME: NEVER
I HAVE BEEN ANXIOUS OR WORRIED FOR NO GOOD REASON: YES, SOMETIMES
THINGS HAVE BEEN GETTING ON TOP OF ME: YES, SOMETIMES I HAVEN'T BEEN COPING AS WELL AS USUAL
I HAVE BLAMED MYSELF UNNECESSARILY WHEN THINGS WENT WRONG: YES, SOME OF THE TIME
I HAVE FELT SAD OR MISERABLE: NOT VERY OFTEN
I HAVE BEEN ABLE TO LAUGH AND SEE THE FUNNY SIDE OF THINGS: AS MUCH AS I ALWAYS COULD
TOTAL SCORE: 9
I HAVE LOOKED FORWARD WITH ENJOYMENT TO THINGS: AS MUCH AS I EVER DID
I HAVE BEEN SO UNHAPPY THAT I HAVE BEEN CRYING: YES, QUITE OFTEN
I HAVE BEEN SO UNHAPPY THAT I HAVE HAD DIFFICULTY SLEEPING: NOT AT ALL
I HAVE FELT SCARED OR PANICKY FOR NO GOOD REASON: NO, NOT AT ALL

## 2023-11-14 NOTE — PROGRESS NOTES
Terra Mathew is a 28 y.o. female who presents with a chief complaint of Postpartum Care (2 wk Incision check)      SUBJECTIVE  Patient presents 2 weeks postop  section.  She is doing well denies any problems.  Incisions clean dry without erythema or drainage.  She denies any headaches blurry vision or spots.  She has no depression, suicidal or homicidal ideation or plan.  She is happy with her delivery.    Past Medical History:   Diagnosis Date    Bronchitis      Past Surgical History:   Procedure Laterality Date     SECTION, CLASSIC  10/26/2023     Social History     Socioeconomic History    Marital status: Single     Spouse name: None    Number of children: None    Years of education: None    Highest education level: None   Occupational History    None   Tobacco Use    Smoking status: Never    Smokeless tobacco: Never   Vaping Use    Vaping Use: Never used   Substance and Sexual Activity    Alcohol use: Not Currently    Drug use: Never    Sexual activity: Not Currently   Other Topics Concern    None   Social History Narrative    None     Social Determinants of Health     Financial Resource Strain: Low Risk  (10/26/2023)    Overall Financial Resource Strain (CARDIA)     Difficulty of Paying Living Expenses: Not hard at all   Food Insecurity: Not on file   Transportation Needs: No Transportation Needs (10/26/2023)    PRAPARE - Transportation     Lack of Transportation (Medical): No     Lack of Transportation (Non-Medical): No   Physical Activity: Not on file   Stress: Not on file   Social Connections: Not on file   Intimate Partner Violence: Not on file     No family history on file.    OB History    Para Term  AB Living   1 1   1   1   SAB IAB Ectopic Multiple Live Births         0 1      # Outcome Date GA Lbr Giacomo/2nd Weight Sex Delivery Anes PTL Lv   1  10/26/23 28w2d   M CS-LTranv Gen  KHAI      Complications: Fetal Intolerance       OBJECTIVE  Allergies   Allergen Reactions  "   Aspirin Hives      (Not in a hospital admission)       Review of Systems  History obtained from the patient  General ROS: negative  Psychological ROS: negative  Gastrointestinal ROS: no abdominal pain, change in bowel habits, or black or bloody stools  Musculoskeletal ROS: negative  Physical Exam  General Appearance: awake, alert, oriented, in no acute distress, well developed, well nourished, and in no acute distress  Skin: there are no suspicious lesions or rashes of concern, skin color, texture, turgor are normal; there are no bruises, rashes or lesions.  Head/Face: NCAT  Eyes: No gross abnormalities., PERRL, and EOMI  Abdomen: Soft, non-tender, normal bowel sounds; no bruits, organomegaly or masses.  Extremities: Extremities warm to touch, pink, with no edema.  Musculoskeletal: negative    /70   Ht 1.6 m (5' 3\")   Wt 89.4 kg (197 lb)   LMP 04/15/2023   Breastfeeding No   BMI 34.90 kg/m²    Problem List Items Addressed This Visit    None  Visit Diagnoses       Delivery of pregnancy by  section    -  Primary         Pp check 4 weeks      "

## 2023-11-27 ENCOUNTER — PHARMACY VISIT (OUTPATIENT)
Dept: PHARMACY | Facility: CLINIC | Age: 28
End: 2023-11-27
Payer: MEDICAID

## 2023-11-27 ENCOUNTER — TELEPHONE (OUTPATIENT)
Dept: OBSTETRICS AND GYNECOLOGY | Facility: HOSPITAL | Age: 28
End: 2023-11-27
Payer: COMMERCIAL

## 2023-11-27 ENCOUNTER — TELEPHONE (OUTPATIENT)
Dept: OBSTETRICS AND GYNECOLOGY | Facility: HOSPITAL | Age: 28
End: 2023-11-27

## 2023-11-27 PROCEDURE — RXMED WILLOW AMBULATORY MEDICATION CHARGE

## 2023-11-27 NOTE — TELEPHONE ENCOUNTER
Patient called in requesting breast Pump   Breast order placed to [facility].  DERICK PortilloN-RN

## 2023-12-12 ENCOUNTER — APPOINTMENT (OUTPATIENT)
Dept: OBSTETRICS AND GYNECOLOGY | Facility: HOSPITAL | Age: 28
End: 2023-12-12
Payer: COMMERCIAL

## 2024-01-02 ENCOUNTER — TELEPHONE (OUTPATIENT)
Dept: OBSTETRICS AND GYNECOLOGY | Facility: HOSPITAL | Age: 29
End: 2024-01-02

## 2024-01-02 ENCOUNTER — APPOINTMENT (OUTPATIENT)
Dept: OBSTETRICS AND GYNECOLOGY | Facility: HOSPITAL | Age: 29
End: 2024-01-02
Payer: COMMERCIAL

## 2024-01-16 VITALS
TEMPERATURE: 99.3 F | HEIGHT: 63 IN | HEART RATE: 104 BPM | WEIGHT: 180 LBS | DIASTOLIC BLOOD PRESSURE: 77 MMHG | RESPIRATION RATE: 18 BRPM | SYSTOLIC BLOOD PRESSURE: 122 MMHG | OXYGEN SATURATION: 100 % | BODY MASS INDEX: 31.89 KG/M2

## 2024-12-06 ENCOUNTER — HOSPITAL ENCOUNTER (EMERGENCY)
Facility: HOSPITAL | Age: 29
Discharge: HOME | End: 2024-12-07
Attending: EMERGENCY MEDICINE
Payer: COMMERCIAL

## 2024-12-06 ENCOUNTER — HOSPITAL ENCOUNTER (INPATIENT)
Facility: HOSPITAL | Age: 29
LOS: 2 days | Discharge: HOME | End: 2024-12-08
Attending: OBSTETRICS & GYNECOLOGY | Admitting: STUDENT IN AN ORGANIZED HEALTH CARE EDUCATION/TRAINING PROGRAM
Payer: COMMERCIAL

## 2024-12-06 VITALS
RESPIRATION RATE: 18 BRPM | SYSTOLIC BLOOD PRESSURE: 115 MMHG | WEIGHT: 197 LBS | HEIGHT: 63 IN | OXYGEN SATURATION: 100 % | BODY MASS INDEX: 34.91 KG/M2 | DIASTOLIC BLOOD PRESSURE: 87 MMHG | TEMPERATURE: 95.5 F | HEART RATE: 112 BPM

## 2024-12-06 DIAGNOSIS — E78.6 LIPOPROTEIN DEFICIENCY DISORDER: ICD-10-CM

## 2024-12-06 DIAGNOSIS — O14.13 SEVERE PRE-ECLAMPSIA IN THIRD TRIMESTER (HHS-HCC): ICD-10-CM

## 2024-12-06 DIAGNOSIS — H52.6 DISORDER OF REFRACTION AND ACCOMMODATION: ICD-10-CM

## 2024-12-06 DIAGNOSIS — O14.23 HEMOLYSIS, ELEVATED LIVER ENZYMES, AND LOW PLATELET (HELLP) SYNDROME DURING PREGNANCY IN THIRD TRIMESTER (HHS-HCC): ICD-10-CM

## 2024-12-06 DIAGNOSIS — F20.9 SCHIZOPHRENIA, UNSPECIFIED TYPE: Chronic | ICD-10-CM

## 2024-12-06 DIAGNOSIS — Z77.22 SECONDHAND SMOKE EXPOSURE: ICD-10-CM

## 2024-12-06 LAB
ABO GROUP (TYPE) IN BLOOD: NORMAL
ANION GAP BLDV CALCULATED.4IONS-SCNC: 7 MMOL/L (ref 10–25)
ANTIBODY SCREEN: NORMAL
BASE EXCESS BLDV CALC-SCNC: 0.8 MMOL/L (ref -2–3)
BODY TEMPERATURE: 37 DEGREES CELSIUS
CA-I BLDV-SCNC: 1.31 MMOL/L (ref 1.1–1.33)
CHLORIDE BLDV-SCNC: 105 MMOL/L (ref 98–107)
ERYTHROCYTE [DISTWIDTH] IN BLOOD BY AUTOMATED COUNT: 16.5 % (ref 11.5–14.5)
FERRITIN SERPL-MCNC: 16 NG/ML
GLUCOSE BLDV-MCNC: 147 MG/DL (ref 74–99)
HBV SURFACE AG SERPL QL IA: NONREACTIVE
HCO3 BLDV-SCNC: 28.5 MMOL/L (ref 22–26)
HCT VFR BLD AUTO: 27.8 % (ref 36–46)
HCT VFR BLD EST: 40 % (ref 36–46)
HCV AB SER QL: NONREACTIVE
HGB BLD-MCNC: 8.9 G/DL (ref 12–16)
HGB BLDV-MCNC: 13.4 G/DL (ref 12–16)
HIV 1+2 AB+HIV1 P24 AG SERPL QL IA: NONREACTIVE
IRON SATN MFR SERPL: NORMAL %
IRON SERPL-MCNC: 29 UG/DL
LACTATE BLDV-SCNC: 1.2 MMOL/L (ref 0.4–2)
MCH RBC QN AUTO: 23.7 PG (ref 26–34)
MCHC RBC AUTO-ENTMCNC: 32 G/DL (ref 32–36)
MCV RBC AUTO: 74 FL (ref 80–100)
NRBC BLD-RTO: 0 /100 WBCS (ref 0–0)
OXYHGB MFR BLDV: 87.1 % (ref 45–75)
PCO2 BLDV: 58 MM HG (ref 41–51)
PH BLDV: 7.3 PH (ref 7.33–7.43)
PLATELET # BLD AUTO: 312 X10*3/UL (ref 150–450)
PO2 BLDV: 65 MM HG (ref 35–45)
POTASSIUM BLDV-SCNC: 3.7 MMOL/L (ref 3.5–5.3)
RBC # BLD AUTO: 3.75 X10*6/UL (ref 4–5.2)
REFLEX ADDED, ANEMIA PANEL: NORMAL
RH FACTOR (ANTIGEN D): NORMAL
RPR SER QL: REACTIVE
RPR SER-TITR: ABNORMAL {TITER}
RUBV IGG SERPL IA-ACNC: 2.4 IA
RUBV IGG SERPL QL IA: POSITIVE
SAO2 % BLDV: 90 % (ref 45–75)
SODIUM BLDV-SCNC: 137 MMOL/L (ref 136–145)
T VAGINALIS RRNA SPEC QL NAA+PROBE: NEGATIVE
TIBC SERPL-MCNC: NORMAL UG/DL
TREPONEMA PALLIDUM IGG+IGM AB [PRESENCE] IN SERUM OR PLASMA BY IMMUNOASSAY: REACTIVE
UIBC SERPL-MCNC: >450 UG/DL
WBC # BLD AUTO: 9.5 X10*3/UL (ref 4.4–11.3)

## 2024-12-06 PROCEDURE — 59414 DELIVER PLACENTA: CPT

## 2024-12-06 PROCEDURE — 59300 EPISIOTOMY OR VAGINAL REPAIR: CPT

## 2024-12-06 PROCEDURE — 99285 EMERGENCY DEPT VISIT HI MDM: CPT | Mod: 25 | Performed by: EMERGENCY MEDICINE

## 2024-12-06 PROCEDURE — 85027 COMPLETE CBC AUTOMATED: CPT

## 2024-12-06 PROCEDURE — 82728 ASSAY OF FERRITIN: CPT

## 2024-12-06 PROCEDURE — 0KQM0ZZ REPAIR PERINEUM MUSCLE, OPEN APPROACH: ICD-10-PCS | Performed by: STUDENT IN AN ORGANIZED HEALTH CARE EDUCATION/TRAINING PROGRAM

## 2024-12-06 PROCEDURE — 96372 THER/PROPH/DIAG INJ SC/IM: CPT | Performed by: EMERGENCY MEDICINE

## 2024-12-06 PROCEDURE — 2500000004 HC RX 250 GENERAL PHARMACY W/ HCPCS (ALT 636 FOR OP/ED): Mod: SE | Performed by: EMERGENCY MEDICINE

## 2024-12-06 PROCEDURE — 86850 RBC ANTIBODY SCREEN: CPT

## 2024-12-06 PROCEDURE — 96374 THER/PROPH/DIAG INJ IV PUSH: CPT

## 2024-12-06 PROCEDURE — 2500000001 HC RX 250 WO HCPCS SELF ADMINISTERED DRUGS (ALT 637 FOR MEDICARE OP)

## 2024-12-06 PROCEDURE — 83550 IRON BINDING TEST: CPT

## 2024-12-06 PROCEDURE — 2500000005 HC RX 250 GENERAL PHARMACY W/O HCPCS

## 2024-12-06 PROCEDURE — 99284 EMERGENCY DEPT VISIT MOD MDM: CPT | Performed by: EMERGENCY MEDICINE

## 2024-12-06 PROCEDURE — 86317 IMMUNOASSAY INFECTIOUS AGENT: CPT

## 2024-12-06 PROCEDURE — 86318 IA INFECTIOUS AGENT ANTIBODY: CPT

## 2024-12-06 PROCEDURE — 86803 HEPATITIS C AB TEST: CPT

## 2024-12-06 PROCEDURE — 87389 HIV-1 AG W/HIV-1&-2 AB AG IA: CPT

## 2024-12-06 PROCEDURE — 87591 N.GONORRHOEAE DNA AMP PROB: CPT

## 2024-12-06 PROCEDURE — 2500000005 HC RX 250 GENERAL PHARMACY W/O HCPCS: Mod: SE | Performed by: EMERGENCY MEDICINE

## 2024-12-06 PROCEDURE — 84132 ASSAY OF SERUM POTASSIUM: CPT

## 2024-12-06 PROCEDURE — 36415 COLL VENOUS BLD VENIPUNCTURE: CPT

## 2024-12-06 PROCEDURE — 7100000016 HC LABOR RECOVERY PER HOUR

## 2024-12-06 PROCEDURE — 87491 CHLMYD TRACH DNA AMP PROBE: CPT

## 2024-12-06 PROCEDURE — 87340 HEPATITIS B SURFACE AG IA: CPT

## 2024-12-06 PROCEDURE — 99285 EMERGENCY DEPT VISIT HI MDM: CPT | Performed by: EMERGENCY MEDICINE

## 2024-12-06 PROCEDURE — 1100000001 HC PRIVATE ROOM DAILY

## 2024-12-06 PROCEDURE — 86901 BLOOD TYPING SEROLOGIC RH(D): CPT

## 2024-12-06 PROCEDURE — 86780 TREPONEMA PALLIDUM: CPT

## 2024-12-06 PROCEDURE — 87661 TRICHOMONAS VAGINALIS AMPLIF: CPT

## 2024-12-06 RX ORDER — FENTANYL CITRATE 50 UG/ML
50 INJECTION, SOLUTION INTRAMUSCULAR; INTRAVENOUS ONCE
Status: COMPLETED | OUTPATIENT
Start: 2024-12-06 | End: 2024-12-06

## 2024-12-06 RX ORDER — FENTANYL CITRATE 50 UG/ML
INJECTION, SOLUTION INTRAMUSCULAR; INTRAVENOUS
Status: COMPLETED
Start: 2024-12-06 | End: 2024-12-06

## 2024-12-06 RX ORDER — SIMETHICONE 80 MG
80 TABLET,CHEWABLE ORAL 4 TIMES DAILY PRN
Status: DISCONTINUED | OUTPATIENT
Start: 2024-12-06 | End: 2024-12-08 | Stop reason: HOSPADM

## 2024-12-06 RX ORDER — METHYLERGONOVINE MALEATE 0.2 MG/ML
0.2 INJECTION INTRAVENOUS ONCE AS NEEDED
Status: DISCONTINUED | OUTPATIENT
Start: 2024-12-06 | End: 2024-12-08 | Stop reason: HOSPADM

## 2024-12-06 RX ORDER — OXYTOCIN/0.9 % SODIUM CHLORIDE 30/500 ML
60 PLASTIC BAG, INJECTION (ML) INTRAVENOUS ONCE AS NEEDED
Status: DISCONTINUED | OUTPATIENT
Start: 2024-12-06 | End: 2024-12-08 | Stop reason: HOSPADM

## 2024-12-06 RX ORDER — CARBOPROST TROMETHAMINE 250 UG/ML
250 INJECTION, SOLUTION INTRAMUSCULAR ONCE AS NEEDED
Status: DISCONTINUED | OUTPATIENT
Start: 2024-12-06 | End: 2024-12-08 | Stop reason: HOSPADM

## 2024-12-06 RX ORDER — LABETALOL HYDROCHLORIDE 5 MG/ML
20 INJECTION, SOLUTION INTRAVENOUS ONCE AS NEEDED
Status: DISCONTINUED | OUTPATIENT
Start: 2024-12-06 | End: 2024-12-08 | Stop reason: HOSPADM

## 2024-12-06 RX ORDER — FENTANYL CITRATE 50 UG/ML
50 INJECTION, SOLUTION INTRAMUSCULAR; INTRAVENOUS ONCE
Status: DISCONTINUED | OUTPATIENT
Start: 2024-12-06 | End: 2024-12-08 | Stop reason: HOSPADM

## 2024-12-06 RX ORDER — DIPHENHYDRAMINE HYDROCHLORIDE 50 MG/ML
25 INJECTION INTRAMUSCULAR; INTRAVENOUS EVERY 6 HOURS PRN
Status: DISCONTINUED | OUTPATIENT
Start: 2024-12-06 | End: 2024-12-08 | Stop reason: HOSPADM

## 2024-12-06 RX ORDER — DIPHENHYDRAMINE HCL 25 MG
25 CAPSULE ORAL EVERY 6 HOURS PRN
Status: DISCONTINUED | OUTPATIENT
Start: 2024-12-06 | End: 2024-12-08 | Stop reason: HOSPADM

## 2024-12-06 RX ORDER — ONDANSETRON HYDROCHLORIDE 2 MG/ML
4 INJECTION, SOLUTION INTRAVENOUS EVERY 6 HOURS PRN
Status: DISCONTINUED | OUTPATIENT
Start: 2024-12-06 | End: 2024-12-08 | Stop reason: HOSPADM

## 2024-12-06 RX ORDER — NIFEDIPINE 10 MG/1
10 CAPSULE ORAL ONCE AS NEEDED
Status: DISCONTINUED | OUTPATIENT
Start: 2024-12-06 | End: 2024-12-08 | Stop reason: HOSPADM

## 2024-12-06 RX ORDER — HYDRALAZINE HYDROCHLORIDE 20 MG/ML
5 INJECTION INTRAMUSCULAR; INTRAVENOUS ONCE AS NEEDED
Status: DISCONTINUED | OUTPATIENT
Start: 2024-12-06 | End: 2024-12-08 | Stop reason: HOSPADM

## 2024-12-06 RX ORDER — LIDOCAINE HYDROCHLORIDE 10 MG/ML
INJECTION, SOLUTION INFILTRATION; PERINEURAL
Status: DISPENSED
Start: 2024-12-06 | End: 2024-12-06

## 2024-12-06 RX ORDER — MISOPROSTOL 200 UG/1
800 TABLET ORAL ONCE AS NEEDED
Status: DISCONTINUED | OUTPATIENT
Start: 2024-12-06 | End: 2024-12-08 | Stop reason: HOSPADM

## 2024-12-06 RX ORDER — LOPERAMIDE HYDROCHLORIDE 2 MG/1
4 CAPSULE ORAL EVERY 2 HOUR PRN
Status: DISCONTINUED | OUTPATIENT
Start: 2024-12-06 | End: 2024-12-08 | Stop reason: HOSPADM

## 2024-12-06 RX ORDER — POLYETHYLENE GLYCOL 3350 17 G/17G
17 POWDER, FOR SOLUTION ORAL 2 TIMES DAILY PRN
Status: DISCONTINUED | OUTPATIENT
Start: 2024-12-06 | End: 2024-12-08 | Stop reason: HOSPADM

## 2024-12-06 RX ORDER — OXYTOCIN 10 [USP'U]/ML
10 INJECTION, SOLUTION INTRAMUSCULAR; INTRAVENOUS ONCE AS NEEDED
Status: DISCONTINUED | OUTPATIENT
Start: 2024-12-06 | End: 2024-12-08 | Stop reason: HOSPADM

## 2024-12-06 RX ORDER — ACETAMINOPHEN 325 MG/1
975 TABLET ORAL EVERY 6 HOURS
Status: DISCONTINUED | OUTPATIENT
Start: 2024-12-06 | End: 2024-12-08 | Stop reason: HOSPADM

## 2024-12-06 RX ORDER — ALBUTEROL SULFATE 90 UG/1
2 INHALANT RESPIRATORY (INHALATION) EVERY 4 HOURS PRN
Status: DISCONTINUED | OUTPATIENT
Start: 2024-12-06 | End: 2024-12-08 | Stop reason: HOSPADM

## 2024-12-06 RX ORDER — LIDOCAINE 560 MG/1
1 PATCH PERCUTANEOUS; TOPICAL; TRANSDERMAL
Status: DISCONTINUED | OUTPATIENT
Start: 2024-12-06 | End: 2024-12-08 | Stop reason: HOSPADM

## 2024-12-06 RX ORDER — TRANEXAMIC ACID 100 MG/ML
1000 INJECTION, SOLUTION INTRAVENOUS ONCE AS NEEDED
Status: DISCONTINUED | OUTPATIENT
Start: 2024-12-06 | End: 2024-12-08 | Stop reason: HOSPADM

## 2024-12-06 RX ORDER — ADHESIVE BANDAGE
10 BANDAGE TOPICAL
Status: DISCONTINUED | OUTPATIENT
Start: 2024-12-06 | End: 2024-12-08 | Stop reason: HOSPADM

## 2024-12-06 RX ORDER — IBUPROFEN 600 MG/1
600 TABLET ORAL EVERY 6 HOURS
Status: DISCONTINUED | OUTPATIENT
Start: 2024-12-06 | End: 2024-12-08 | Stop reason: HOSPADM

## 2024-12-06 RX ORDER — OXYTOCIN/0.9 % SODIUM CHLORIDE 30/500 ML
PLASTIC BAG, INJECTION (ML) INTRAVENOUS
Status: DISPENSED
Start: 2024-12-06 | End: 2024-12-06

## 2024-12-06 RX ORDER — CARBOPROST TROMETHAMINE 250 UG/ML
250 INJECTION, SOLUTION INTRAMUSCULAR ONCE AS NEEDED
Status: COMPLETED | OUTPATIENT
Start: 2024-12-06 | End: 2024-12-06

## 2024-12-06 RX ORDER — ONDANSETRON 4 MG/1
4 TABLET, FILM COATED ORAL EVERY 6 HOURS PRN
Status: DISCONTINUED | OUTPATIENT
Start: 2024-12-06 | End: 2024-12-08 | Stop reason: HOSPADM

## 2024-12-06 RX ORDER — BISACODYL 10 MG/1
10 SUPPOSITORY RECTAL DAILY PRN
Status: DISCONTINUED | OUTPATIENT
Start: 2024-12-06 | End: 2024-12-08 | Stop reason: HOSPADM

## 2024-12-06 RX ADMIN — SODIUM CHLORIDE 1000 ML: 9 INJECTION, SOLUTION INTRAVENOUS at 04:15

## 2024-12-06 RX ADMIN — FENTANYL CITRATE 50 MCG: 50 INJECTION, SOLUTION INTRAMUSCULAR; INTRAVENOUS at 04:14

## 2024-12-06 RX ADMIN — CARBOPROST TROMETHAMINE 250 MCG: 250 INJECTION, SOLUTION INTRAMUSCULAR at 04:15

## 2024-12-06 SDOH — HEALTH STABILITY: MENTAL HEALTH: WISH TO BE DEAD (PAST 1 MONTH): NO

## 2024-12-06 SDOH — SOCIAL STABILITY: SOCIAL INSECURITY: PHYSICAL ABUSE: DENIES

## 2024-12-06 SDOH — SOCIAL STABILITY: SOCIAL INSECURITY: ARE THERE ANY APPARENT SIGNS OF INJURIES/BEHAVIORS THAT COULD BE RELATED TO ABUSE/NEGLECT?: NO

## 2024-12-06 SDOH — SOCIAL STABILITY: SOCIAL INSECURITY: ABUSE SCREEN: ADULT

## 2024-12-06 SDOH — SOCIAL STABILITY: SOCIAL INSECURITY: ARE YOU OR HAVE YOU BEEN THREATENED OR ABUSED PHYSICALLY, EMOTIONALLY, OR SEXUALLY BY ANYONE?: NO

## 2024-12-06 SDOH — HEALTH STABILITY: MENTAL HEALTH: HAVE YOU USED ANY PRESCRIPTION DRUGS OTHER THAN PRESCRIBED IN THE PAST 12 MONTHS?: NO

## 2024-12-06 SDOH — HEALTH STABILITY: MENTAL HEALTH: HAVE YOU USED ANY SUBSTANCES (CANABIS, COCAINE, HEROIN, HALLUCINOGENS, INHALANTS, ETC.) IN THE PAST 12 MONTHS?: NO

## 2024-12-06 SDOH — SOCIAL STABILITY: SOCIAL INSECURITY: DO YOU FEEL ANYONE HAS EXPLOITED OR TAKEN ADVANTAGE OF YOU FINANCIALLY OR OF YOUR PERSONAL PROPERTY?: NO

## 2024-12-06 SDOH — HEALTH STABILITY: MENTAL HEALTH: WERE YOU ABLE TO COMPLETE ALL THE BEHAVIORAL HEALTH SCREENINGS?: YES

## 2024-12-06 SDOH — SOCIAL STABILITY: SOCIAL INSECURITY: DOES ANYONE TRY TO KEEP YOU FROM HAVING/CONTACTING OTHER FRIENDS OR DOING THINGS OUTSIDE YOUR HOME?: NO

## 2024-12-06 SDOH — HEALTH STABILITY: MENTAL HEALTH: NON-SPECIFIC ACTIVE SUICIDAL THOUGHTS (PAST 1 MONTH): NO

## 2024-12-06 SDOH — ECONOMIC STABILITY: HOUSING INSECURITY: DO YOU FEEL UNSAFE GOING BACK TO THE PLACE WHERE YOU ARE LIVING?: NO

## 2024-12-06 SDOH — SOCIAL STABILITY: SOCIAL INSECURITY: HAVE YOU HAD ANY THOUGHTS OF HARMING ANYONE ELSE?: NO

## 2024-12-06 SDOH — HEALTH STABILITY: MENTAL HEALTH: SUICIDAL BEHAVIOR (LIFETIME): NO

## 2024-12-06 SDOH — SOCIAL STABILITY: SOCIAL INSECURITY: VERBAL ABUSE: DENIES

## 2024-12-06 SDOH — SOCIAL STABILITY: SOCIAL INSECURITY: HAS ANYONE EVER THREATENED TO HURT YOUR FAMILY OR YOUR PETS?: NO

## 2024-12-06 ASSESSMENT — PAIN SCALES - GENERAL
PAINLEVEL_OUTOF10: 8
PAINLEVEL_OUTOF10: 8
PAINLEVEL_OUTOF10: 5 - MODERATE PAIN
PAINLEVEL_OUTOF10: 7
PAINLEVEL_OUTOF10: 7
PAINLEVEL_OUTOF10: 6

## 2024-12-06 ASSESSMENT — PATIENT HEALTH QUESTIONNAIRE - PHQ9
1. LITTLE INTEREST OR PLEASURE IN DOING THINGS: NOT AT ALL
2. FEELING DOWN, DEPRESSED OR HOPELESS: NOT AT ALL
SUM OF ALL RESPONSES TO PHQ9 QUESTIONS 1 & 2: 0

## 2024-12-06 ASSESSMENT — LIFESTYLE VARIABLES
HOW OFTEN DO YOU HAVE A DRINK CONTAINING ALCOHOL: NEVER
AUDIT-C TOTAL SCORE: 0
HOW MANY STANDARD DRINKS CONTAINING ALCOHOL DO YOU HAVE ON A TYPICAL DAY: PATIENT DOES NOT DRINK
SKIP TO QUESTIONS 9-10: 1
HOW OFTEN DO YOU HAVE 6 OR MORE DRINKS ON ONE OCCASION: NEVER
AUDIT-C TOTAL SCORE: 0

## 2024-12-06 ASSESSMENT — COLUMBIA-SUICIDE SEVERITY RATING SCALE - C-SSRS
6. HAVE YOU EVER DONE ANYTHING, STARTED TO DO ANYTHING, OR PREPARED TO DO ANYTHING TO END YOUR LIFE?: NO
2. HAVE YOU ACTUALLY HAD ANY THOUGHTS OF KILLING YOURSELF?: NO
1. IN THE PAST MONTH, HAVE YOU WISHED YOU WERE DEAD OR WISHED YOU COULD GO TO SLEEP AND NOT WAKE UP?: NO

## 2024-12-06 ASSESSMENT — PAIN DESCRIPTION - DESCRIPTORS
DESCRIPTORS: ACHING;SORE
DESCRIPTORS: ACHING;SORE

## 2024-12-06 NOTE — L&D DELIVERY NOTE
OB Delivery Note  2024  Terra Mathew  29 y.o.   Vaginal, Spontaneous     Patient presented to ED via EMS s/p spontaneous vaginal delivery in the field. Patient did not know she was pregnant.   Placenta in situ on arrival, spontaneously expressed.   Pitocin bolus x2 and Hemabate given for bleeding.    Upon transfer to Mac 2, 2nd degree laceration reparied. Bilateral labial lacerations hemostatic, not repaired.    Gestational Age: Unknown  /Para:   Quantitative Blood Loss: Admission to Discharge: 300 mL (2024  4:30 AM - 2024  6:10 AM)    Keily Mathewyael [[id number]]      Labor Events    Labor type: Spontaneous Onset of Labor  Augmentation: None  Complications: None       Placenta    Placenta delivery date/time: 2024 0405  Placenta removal: Spontaneous  Placenta appearance: Intact  Placenta disposition: pathology       Cord    Vessels: 3 vessels  Gases sent?: No  Cord comments: baby delivered outside of hospital  Stem cell collection (by provider): No       Lacerations    Episiotomy: None  Perineal laceration: 2nd  Labial laceration?: Yes  Labial laceration location: bilateral  Labial laceration repaired?: No  Repair suture: 2-0 Synthetic Suture       Anesthesia    Method: None       Rowan Delivery    Birth date/time: 2024 03:18:00  Delivery type: Vaginal, Spontaneous  Complications: None       Resuscitation    Method: None       Apgars    Living status: Living  Apgar Component Scores:  1 min.:  5 min.:  10 min.:  15 min.:  20 min.:    Skin color:  2  2       Heart rate:  2  2       Reflex irritability:  2  2       Muscle tone:  2  2       Respiratory effort:  2  2       Total:  10  10       Apgars assigned by: EMS PER REPORT ON PT ARRIVAL TO ED       Delivery Providers    Delivering clinician: Maida Francis MD   Provider Role    Elizabeth Ortega RN Delivery Nurse    Maryann Novoa, RN Nursery Nurse    Danica Phelps MD Resident                 Danica Phelps MD

## 2024-12-06 NOTE — ED PROVIDER NOTES
CC: Pregnancy Problem     HPI:   Patient is a G2, P2 presenting as a code pink with baby born prior to arrival.  Patient has a history of history of schizophrenia, major depressive disorders and vertigo for reading recent psychiatry note from 2024.  She reports that she had abdominal pain today and felt like she needed to use the bathroom and then delivered the baby prior to EMS arrival.  Was unaware that she was pregnant but did not have her menstrual cycle since having a  last year for other child.  Patient reports a history of asthma and only takes an inhaler as needed and also has a history of hypertension takes blood pressure medications.  She is unable to name them at this time.  Patient endorses an allergy to aspirin with hives.  On chart review, patient appears to be on nifedipine 90 mg extended release.  Patient has stable vitals with placenta not yet delivered upon arrival.  She reports that she was in her usual state of health and denies any recent fevers, chills, chest pain or shortness of breath, nausea or vomiting, diarrhea or constipation.    Limitations to History: none  Additional History Obtained from: EMS    PMHx/PSHx:  Per HPI.   - has a past medical history of Bronchitis.  - has a past surgical history that includes  section, classic (10/26/2023).    Social History:  - Tobacco:  reports that she has never smoked. She has never used smokeless tobacco.   - Alcohol:  reports that she does not currently use alcohol.   - Drugs:  reports no history of drug use.     Medications: Reviewed in EMR.     Allergies:  Aspirin    ???????????????????????????????????????????????????????????????  Triage Vitals:  T 35.3 °C (95.5 °F)  HR (!) 112  /87  RR 18  O2 100 % None (Room air)    Physical Exam  Vitals and nursing note reviewed.   Constitutional:       General: She is not in acute distress.     Appearance: She is well-developed.   HENT:      Head: Normocephalic and atraumatic.       Mouth/Throat:      Mouth: Mucous membranes are dry.      Pharynx: Oropharynx is clear.   Eyes:      Conjunctiva/sclera: Conjunctivae normal.   Cardiovascular:      Rate and Rhythm: Regular rhythm. Tachycardia present.      Heart sounds: No murmur heard.  Pulmonary:      Effort: Pulmonary effort is normal. No respiratory distress.      Breath sounds: Normal breath sounds. No wheezing, rhonchi or rales.   Abdominal:      General: There is distension (firm uterine fundus).      Palpations: Abdomen is soft.      Tenderness: There is no abdominal tenderness. There is no guarding or rebound.   Genitourinary:     Comments: Persistent oozing from vagina around umbilical cord. No hemorrhage.   Musculoskeletal:         General: No swelling or tenderness.      Cervical back: Neck supple.   Skin:     General: Skin is warm and dry.      Capillary Refill: Capillary refill takes less than 2 seconds.   Neurological:      Mental Status: She is alert and oriented to person, place, and time.      Cranial Nerves: No cranial nerve deficit.       ???????????????????????????????????????????????????????????????  EKG (per my interpretation):  not obtained    ED Course  Diagnoses as of 24 1712   Delivery of  ([facility])       Medical Decision Making:  Patient is a G2, P2 presenting post birth of her second child at 3:18 AM at home.  Patient did not receive prenatal care as she was unaware that she was pregnant.  Patient was given Pitocin and placenta was delivered.  Uterine fundus is firm and patient is hemodynamically stable.  She was given Hemabate as well due to persistent minimal oozing of blood.  Patient was given fentanyl for pain relief given contractions.  Patient reports that her initial pregnancy was a  and denies any significant complications with previous pregnancy.  Patient and baby boy were both taken to postpartum floor.  Patient's baby was slightly cold given environmental exposures as it is of cold  night.  Patient was actively getting rewarmed however the baby warmer was not working fully.  Patient was taken and a warmed incubator with mother to the postpartum floor.  Patient was hemodynamically stable other than expected tachycardia postpartum. It is unusual that the patient was unaware that she was pregnant and reports being a stay at home mother. Patient's mother and cousin live with her and she reports knowing father of the baby. She reports feeling safe at home but further investigation into possible trafficking should be considered given her denial vs unawareness of being pregnant after being postpartum just over a year ago.    External records reviewed: recent inpatient, clinic, and prior ED notes  Diagnostic imaging independently reviewed/interpreted by me (as reflected in MDM) includes: none  Social Determinants Affecting Care:  unaware that she was pregnant  Discussion of management with other providers: attending, OB, NICU, Peds ED  Prescription Drug Consideration: none  Escalation of Care: admission    Impression:   Postpartum    Disposition: Sent to L&D      Procedures ? SmartLinks last updated 12/9/2024 5:12 PM        Josy Turpin MD  Resident  12/06/24 0440       Josy Turpin MD  Resident  12/06/24 0441       Khushi Chun DO  12/09/24 1712

## 2024-12-06 NOTE — HOSPITAL COURSE
Baby born 10/26/23 for C section low transverse, baby born 28w2d  Hx of severe pre-eclampsia in third trimester and HELLP syndrome     MDD, bipolar, schizophrenia     Positive HcG 24- patient did not want to keep pregnancy  -chlmaydia positive at that time, treated    Asthma PRN   Hypertension- possibly nifedipine 90 mg ER

## 2024-12-06 NOTE — H&P
OB Admission H&P    Assessment/Plan    Terra Mathew is a 29 y.o.  at unknown gestational age is admitted s/p  in the field .    Postpartum Care  - Feeding: bottle  - ppBC: to be discussed  - PNLs pending  - DVT prophylaxis: SCDs and ambulation  - for SW consult     cHTN vs h/o sPEC  - patient reports taking BP meds, however no med refills in the past year. Will continue to monitor BPs and start medications as necessary    Dispo: Admit to L&D    D/w Dr. Tito Phelps MD  PGY-3, Obstetrics and Gynecology    Pregnancy Problems (from 24 to present)       Problem Noted Diagnosed Resolved    Labor and delivery indication for care or intervention ([facility]) 2024 by Karlie Victoria MD  No    Priority:  Medium               Subjective   Tyler is a  at unknown gestational age presents via EMS s/p delivery at home, placenta delivered on arrival.     Patient did not know she was pregnant, per EMS denying pregnancy during her labor at home. Baby appeared term.     Pregnancy notable for:  - h/o 28wk CS for sPEC and c/f HELLP  - patient reports HTN on medication, takes inconsistently,, did not take today. On chart review nifed 90 not refilled for the past year  - asthma  - schizophrenia not on meds      OB History    Para Term  AB Living   2 2 1 1 0 2   SAB IAB Ectopic Multiple Live Births   0 0 0 0 2      # Outcome Date GA Lbr Giacomo/2nd Weight Sex Type Anes PTL Lv   2 Term 24    M  None  KHAI   1  10/26/23 28w2d   M CS-LTranv Gen  KHAI      Complications: Fetal Intolerance      Name: BOUCHRA MATHEW      Apgar1: 2  Apgar5: 8       Past Surgical History:   Procedure Laterality Date     SECTION, CLASSIC  10/26/2023       Social History     Tobacco Use    Smoking status: Never    Smokeless tobacco: Never   Substance Use Topics    Alcohol use: Not Currently       Allergies   Allergen Reactions    Aspirin Hives       Medications Prior to Admission    Medication Sig Dispense Refill Last Dose/Taking    acetaminophen (Tylenol) 500 mg tablet Take 2 tablets (1,000 mg) by mouth every 6 hours if needed for moderate pain (4 - 6). 120 tablet 0     albuterol (Ventolin HFA) 90 mcg/actuation inhaler        NIFEdipine ER (Adalat CC) 90 mg 24 hr tablet Take 1 tablet (90 mg) by mouth once daily in the morning. Take before meals. Do not crush, chew, or split. Do not start before October 31, 2023. 30 tablet 1      Objective     Last Vitals  Temp Pulse Resp BP MAP O2 Sat   36.2 °C (97.2 °F) 91 20 123/85 101 99 %     Blood Pressures         12/6/2024  0429             BP: 123/85             Physical Exam  General: alert, appears uncomfortable  Cardiopulmonary: warm and well perfused, breathing comfortably on room air  Abdomen: Gravid  Extremities: Symmetric    On arrival, placenta in situ. See delivery note for details.       68.3

## 2024-12-06 NOTE — ED TRIAGE NOTES
Pt arrived via EMS pt was delivered baby at home @ 0318, per pt she did not know she was pregnant was having normal menstrual cycle throughout pregnancy, no prenatal care, baby appears to be full term, baby crying on arrival to ER

## 2024-12-06 NOTE — SIGNIFICANT EVENT
Patient meets criteria for home monitoring of blood pressure post discharge.  Reason:  history of chronic hypertension. Met with patient to assess for availability of home BP monitor.  Patient stated she owns home BP monitor. Patient educated on importance of continuing to monitor BP at home, recording BP on home monitoring log and s/sx of when to call her provider.  Pt verbalized understanding the above information.

## 2024-12-06 NOTE — LACTATION NOTE
This note was copied from a baby's chart.  Lactation Consultant Note  Lactation Consultation   Pastora Sorensen RN IBCLC  Recommendations/Summary       This mom plans on using formula.

## 2024-12-06 NOTE — CARE PLAN
Problem: Postpartum  Goal: No s/sx of hemorrhage  Outcome: Progressing     Problem: Hypertensive Disorder of Pregnancy (HDP)  Goal: Minimal s/sx of HDP and BP<160/110  Outcome: Progressing     Problem: Pain - Adult  Goal: Verbalizes/displays adequate comfort level or baseline comfort level  Outcome: Progressing     Problem: Safety - Adult  Goal: Free from fall injury  Outcome: Progressing     Throughout the course of the shift vitals have remained WNL. Made progress to adequately controlling pain with PO medications. Goals for this shift include visiting infant in the NICU.

## 2024-12-06 NOTE — PROGRESS NOTES
Social Work Assessment       Patient: Terra Mathew   Address: [address] [place], [place] [id number]  Phone: [phone]    Referral Reason: No PNC- patient reports she was unaware of pregnancy     Prenatal Care: None      Name: Vic Mathew    : 24    Other Children: Yes, 10/26/23 (John Paul)     Household Composition: Lives with family     IPV/DV or Safety Concerns: Denies     Car-Seat: Yes, purchased through the [employer] store on this date   Safe Sleep Space: Yes, donated pack n' play   Safe Sleep Education: Yes     Transportation Concerns: Lyft needed at discharge     School/Work/Income: Unemployed     Insurance: [facility]     Mental Health Diagnoses: Anxiety, Depression, Schizophrenia   Medication(s): Past   Counseling: Current- [facility]     Supports: Family, FOB is not actively involved     Substance Use History: Denies     Toxicology Screens: N/A     Department of Children and Family Services (DCFS): N/A     Assessment: SW met with patient at bedside to introduce self, and SW role.  Ms. Mathew is friendly, receptive, and easy to engage in conversation.  She lives in stable housing with her mother, and cousin.  No safety concerns reported.  Patient purchased a car seat through the [employer] on this date.  She reports she has a donated pack n' play for  at home.    Ms. Mathew verbalized her understanding of the ABC's of safe sleep.  [place] follow-up at [facility].  She is ware of services offered through [employer].  She's also aware of transportation services provided through her health insurance.  Household receives SNAP benefits.  She plans to apply for WIC.  She is currently unemployed.  Ms. Mathew's one year old son receives approximately $600 a month in SSI due to respiratory issues per patient.  She's already linked to [facility] for her other son, John Paul.  She denies barriers to care.  According to patient, she was unaware of this pregnancy.  There is no  evidence of trafficking or abuse of this patient    Patient reported  boy was transferred to NICU for further observation of his respiratory status.  SW provided NICU location details, etc.  Patient denies having any other questions/concerns.  Emotional support provided- SW provided  resource list     Per chart review, patient has a history of anxiety, depression, and schizophrenia.  She endorses anxiety, and depression.  But, she denies Schizophrenia diagnosis.  PPD discussed.  She receives counseling services through [facility].  Per chart review, most recent appointment on 24.  Stable mood reported.  No meds currently.  She denies needing any additional mental health resources.  Patient also denies having any other unmet needs at this time     Plan: Ms. Mathew MRN: [id number], and  boy Vic MRN: [id number], are appropriate for discharge from SW perspective when medically stable     Signature: Juani CAIN, LSW

## 2024-12-07 LAB
C TRACH RRNA SPEC QL NAA+PROBE: NEGATIVE
ERYTHROCYTE [DISTWIDTH] IN BLOOD BY AUTOMATED COUNT: 16.7 % (ref 11.5–14.5)
HCT VFR BLD AUTO: 23.4 % (ref 36–46)
HGB BLD-MCNC: 6.9 G/DL (ref 12–16)
MCH RBC QN AUTO: 23.6 PG (ref 26–34)
MCHC RBC AUTO-ENTMCNC: 29.5 G/DL (ref 32–36)
MCV RBC AUTO: 80 FL (ref 80–100)
N GONORRHOEA DNA SPEC QL PROBE+SIG AMP: POSITIVE
NRBC BLD-RTO: 0 /100 WBCS (ref 0–0)
PLATELET # BLD AUTO: 297 X10*3/UL (ref 150–450)
RBC # BLD AUTO: 2.92 X10*6/UL (ref 4–5.2)
WBC # BLD AUTO: 8.7 X10*3/UL (ref 4.4–11.3)

## 2024-12-07 PROCEDURE — 85027 COMPLETE CBC AUTOMATED: CPT

## 2024-12-07 PROCEDURE — 2500000005 HC RX 250 GENERAL PHARMACY W/O HCPCS

## 2024-12-07 PROCEDURE — 36415 COLL VENOUS BLD VENIPUNCTURE: CPT

## 2024-12-07 PROCEDURE — 2500000001 HC RX 250 WO HCPCS SELF ADMINISTERED DRUGS (ALT 637 FOR MEDICARE OP)

## 2024-12-07 PROCEDURE — 1100000001 HC PRIVATE ROOM DAILY

## 2024-12-07 PROCEDURE — 2500000004 HC RX 250 GENERAL PHARMACY W/ HCPCS (ALT 636 FOR OP/ED)

## 2024-12-07 RX ORDER — PENICILLIN G SODIUM 5000000 [USP'U]/1
2.4 INJECTION, POWDER, FOR SOLUTION INTRAMUSCULAR; INTRAVENOUS ONCE
Status: COMPLETED | OUTPATIENT
Start: 2024-12-07 | End: 2024-12-07

## 2024-12-07 RX ORDER — CEFTRIAXONE 1 G/1
500 INJECTION, POWDER, FOR SOLUTION INTRAMUSCULAR; INTRAVENOUS ONCE
Status: COMPLETED | OUTPATIENT
Start: 2024-12-07 | End: 2024-12-07

## 2024-12-07 ASSESSMENT — PAIN SCALES - GENERAL
PAINLEVEL_OUTOF10: 6
PAINLEVEL_OUTOF10: 0 - NO PAIN
PAINLEVEL_OUTOF10: 6

## 2024-12-07 ASSESSMENT — PAIN DESCRIPTION - DESCRIPTORS: DESCRIPTORS: ACHING;SORE

## 2024-12-07 NOTE — PROGRESS NOTES
Postpartum Progress Note    Assessment/Plan   Terra Mathew is a 29 y.o., , who delivered at Unknown gestation    Now PPD#1 s/p Vaginal, Spontaneous on 2024  - Continue routine postpartum care  - Pain well controlled on po medications  - DVT risk score DVT Score: 5 , ppx with SCDs, ambulation, and lovenox  - RH positive, rhogam not indicated  - Hgb:   Results from last 7 days   Lab Units 24  0549 24  0524   HEMOGLOBIN g/dL 6.9* 8.9*      Anemia  -   - Hgb trend as above  - Repeat CBC pending for AM to assess stability  - Offered blood transfusion, patient elected for IV iron given asymptomatic and vitals stable  - Repeat CBC in AM, advised if lower, change in vitals or becomes symptomatic, would recommend blood transfusion  - Asymptomatic  - PO iron on d/c     Syphilis, Late Latent  - Screening positive on PNL, reflex RPR positive and titer 1:64  - Last syphilis screening neg Oct 2023, will treat as late latent  - 2.4mu IM Penicillin G ordered  - Discussed treatment with weekly injections, will need two appointments facilitated on discharge and post-treatment follow-up  - Counseled patient regarding:   - Timing of missed doses and possible need for re-treatment if >9 days have elapsed   - Need for partner treatment and how to facilitate   - Contraception/abstinence during treatment  - Patient may potentially struggled with transportation, ODALYS Randle contacted for assistance    Gonorrhea  - Will treat with 500mg IM ceftriaxone  - Advised regarding partner treatment and intercourse precautions    cHTN  - Normotensive on no meds, was previously on nifed 90mg daily, but no longer taking  - HELLP labs negative  - BP cuff for home  - Asymptomatic  - Reviewed 3 day stay for BP monitoring  - Reviewed signs of elevated BP, appropriate BP parameters and how to monitor BP at home     Maternal Well-Being  - NPNC, SW to be involved and assist as needed  - Schizophrenia, stable, no  meds  - Vitals stable  - All questions and concerns address     Feeding  - Breastfeeding/pumping encouraged  - Lactation consult prn    Contraception  - POPs, then patch at 4-6wk visit  - Education provided    Dispo  - Anticipate d/c on PPD #3 if meeting all postpartum milestones and pending BP control  - Follow-up in 2-5 days for BP check  - Follow-up in 4-6wks with primary HOLLY Escoto, APRN-CNP     Principal Problem:    Labor and delivery indication for care or intervention ([facility])    Pregnancy Problems (from 24 to present)       Problem Noted Diagnosed Resolved    Labor and delivery indication for care or intervention ([facility]) 2024 by Karlie Victoria MD  No    Priority:  Medium               Subjective     Terra aMthew is PPD#1 s/p vaginal delivery who reports feeling overall well.  No acute events overnight.  Voiding spontaneously, passing flatus.  Pain well controlled on PO meds.  Light lochia. Tolerating diet.  Denies HA, CP, SOB, RUQ pain, vision changes or N/V. Denies dizziness, lightheadedness, LOC, or uncontrolled bleeding.    Objective   Allergies:   Aspirin         Last Vitals:  Temp Pulse Resp BP MAP Pulse Ox   37.1 °C (98.8 °F) 105 18 111/75   96 %     Vitals Min/Max Last 24 Hours:  Temp  Min: 36.4 °C (97.5 °F)  Max: 37.1 °C (98.8 °F)  Pulse  Min: 82  Max: 105  Resp  Min: 16  Max: 18  BP  Min: 101/66  Max: 115/79    Intake/Output:   No intake or output data in the 24 hours ending 24 [id number]    Physical Exam:  General: examination reveals a well developed, well nourished, female, in no acute distress. She is alert and cooperative.  HEENT: external ears normal. Nose normal, no erythema or discharge.  Neck: supple, no significant adenopathy  Lungs: breathing even and unlabored, lungs clear  Cardiac: warm and well perfused, heart rate regular  Fundus: firm and below umbilicus, lochia light  Abdominal: soft, non-tender, non-distended, bowel sounds  active  Extremities: no redness or tenderness in the calves or thighs, edema: non-pitting BLE  Neurological: alert, oriented, normal speech, no focal findings or movement disorder noted.  Psychological: awake and alert; oriented to person, place, and time.  Skin: no rashes or lesions    Lab Data:  Labs in chart were reviewed.

## 2024-12-08 VITALS
OXYGEN SATURATION: 97 % | SYSTOLIC BLOOD PRESSURE: 115 MMHG | HEART RATE: 106 BPM | DIASTOLIC BLOOD PRESSURE: 77 MMHG | RESPIRATION RATE: 18 BRPM | TEMPERATURE: 99 F

## 2024-12-08 DIAGNOSIS — A52.8 LATE LATENT SYPHILIS: Primary | ICD-10-CM

## 2024-12-08 LAB
ERYTHROCYTE [DISTWIDTH] IN BLOOD BY AUTOMATED COUNT: 16.8 % (ref 11.5–14.5)
HCT VFR BLD AUTO: 22.9 % (ref 36–46)
HGB BLD-MCNC: 7.4 G/DL (ref 12–16)
MCH RBC QN AUTO: 24.3 PG (ref 26–34)
MCHC RBC AUTO-ENTMCNC: 32.3 G/DL (ref 32–36)
MCV RBC AUTO: 75 FL (ref 80–100)
NRBC BLD-RTO: 0 /100 WBCS (ref 0–0)
PLATELET # BLD AUTO: 309 X10*3/UL (ref 150–450)
RBC # BLD AUTO: 3.04 X10*6/UL (ref 4–5.2)
WBC # BLD AUTO: 9 X10*3/UL (ref 4.4–11.3)

## 2024-12-08 PROCEDURE — 36415 COLL VENOUS BLD VENIPUNCTURE: CPT

## 2024-12-08 PROCEDURE — 2500000001 HC RX 250 WO HCPCS SELF ADMINISTERED DRUGS (ALT 637 FOR MEDICARE OP)

## 2024-12-08 PROCEDURE — RXMED WILLOW AMBULATORY MEDICATION CHARGE

## 2024-12-08 PROCEDURE — 99239 HOSP IP/OBS DSCHRG MGMT >30: CPT

## 2024-12-08 PROCEDURE — 2500000004 HC RX 250 GENERAL PHARMACY W/ HCPCS (ALT 636 FOR OP/ED)

## 2024-12-08 PROCEDURE — 85027 COMPLETE CBC AUTOMATED: CPT

## 2024-12-08 RX ORDER — FERROUS SULFATE 325(65) MG
325 TABLET ORAL
Qty: 30 TABLET | Refills: 0 | Status: SHIPPED | OUTPATIENT
Start: 2024-12-08 | End: 2025-01-07

## 2024-12-08 RX ORDER — ACETAMINOPHEN 325 MG/1
975 TABLET ORAL EVERY 6 HOURS PRN
Qty: 120 TABLET | Refills: 0 | Status: SHIPPED | OUTPATIENT
Start: 2024-12-08 | End: 2025-01-07

## 2024-12-08 RX ORDER — POLYETHYLENE GLYCOL 3350 17 G/17G
17 POWDER, FOR SOLUTION ORAL 2 TIMES DAILY
Qty: 28 PACKET | Refills: 0 | Status: SHIPPED | OUTPATIENT
Start: 2024-12-08 | End: 2024-12-22

## 2024-12-08 ASSESSMENT — PAIN SCALES - GENERAL
PAINLEVEL_OUTOF10: 5 - MODERATE PAIN
PAINLEVEL_OUTOF10: 4
PAINLEVEL_OUTOF10: 3

## 2024-12-08 ASSESSMENT — PAIN DESCRIPTION - DESCRIPTORS: DESCRIPTORS: ACHING;SORE

## 2024-12-08 NOTE — DISCHARGE SUMMARY
Discharge Summary    Admission Date: 2024  Discharge Date: 24  Discharge Diagnosis: Labor and delivery indication for care or intervention ([facility])       Patient Active Problem List   Diagnosis    Disorder of refraction and accommodation    Exertional asthma ([facility])    Lipoprotein deficiency disorder    Major depressive disorder    Schizophrenia    Secondhand smoke exposure    Academic skill disorder    Severe pre-eclampsia in third trimester ([facility])    Hemolysis, elevated liver enzymes, and low platelet (HELLP) syndrome during pregnancy in third trimester ([facility])    Labor and delivery indication for care or intervention ([facility])         Hospital Course  Terra Mathew is a 29 y.o.,  who delivered at unknown gestation. Arrived by EMS s/p delivery at home, placenta delivered on arrival. Patient did not know she was pregnant.       Admission Date: 2024    Delivery Date: 2024 3:18 AM    Delivery type: Vaginal, Spontaneous     GA at delivery: Unknown    Outcome: Living    Anesthesia during delivery: None    Intrapartum complications: None    Feeding method: Breastfeeding Status: No  Patient is strictly formula feeding and was counseled on non-pharmacologic methods of suppressing lactation including avoiding nipple/breast stimulation, avoiding heat application, applying ice, and wearing a supportive bra.  Also counseled on s/sx of engorgement and mastitis.     Contraception: POPs, until 6 week visit, she would then like transdermal patches.    Now postpartum day: 2.    Hospital course n/f:    cHTN  - Normotensive on no meds, was previously on nifed 90mg daily, but no longer taking  - HELLP labs negative  - BP cuff for home  - Asymptomatic  -I have reviewed with the patient the standard 3 day stay for hypertensive disorders and the risks/benefits of early discharge, including death, stroke, seizure, and readmission. I strongly recommended that the patient stay for the recommended  3 days. She verbalizes understanding of risks. She verbalizes understanding of symptoms and BPs that warrant immediate medical attention, and desires discharge home today.    - Reviewed signs of elevated BP, appropriate BP parameters and how to monitor BP at home   - To monitor blood pressures at home twice daily, log, and BP cuff given  - Reviewed signs of elevated BP, appropriate BP parameters and how to monitor BP at home   - Outpatient follow-up in 3 days for BP check, message sent to [facility] for assistance with scheduling and coordination.  -Reports that she follows with Dr. Power Weber at [facility] as her PCP    Schizophrenia  -Patient denies diagnosis but endorses seeing Mental Health counselor Leann Fitzgerald at [facility].  -Denies SI/HI  -Reports that she is not taking any medications, and her mood is stable    Anemia  -   - Hgb trend as above  - Repeat CBC pending for AM to assess stability  - Offered blood transfusion, patient elected for IV iron given asymptomatic and vitals stable  - Repeat CBC in AM, advised if lower, change in vitals or becomes symptomatic, would recommend blood transfusion  - Asymptomatic  - PO iron on d/c      Syphilis, Late Latent  - Screening positive on PNL, reflex RPR positive and titer 1:64  - Last syphilis screening neg Oct 2023, will treat as late latent  - 2.4mu IM Penicillin G ordered  - Discussed treatment with weekly injections, will need two appointments facilitated on discharge and post-treatment follow-up  - Counseled patient regarding:              - Timing of missed doses and possible need for re-treatment if >9 days have elapsed              - Need for partner treatment and how to facilitate              - Contraception/abstinence during treatment  - Patient may potentially struggled with transportation, reports that she can make appointments if she uses her mothers insurance for Kristine Lauren CHW contacted for assistance  -Message sent to [facility]  town RN's to follow up patients care, and ensure follow up.   -Referral for PCP on discharge for Q6 month surveillance for 24 months.     Gonorrhea  - s/p treatment with 500 mg IM ceftriaxone  - Advised regarding partner treatment and intercourse precautions       Last Vitals  Temp Pulse Resp BP MAP O2 Sat   37.2 °C (99 °F) 106 18 115/77 90 97 %       Pertinent Subjective and Physical Exam At Time of Discharge  Patient seen at bedside, she is doing well with no acute events overnight. Her postpartum course was otherwise uneventful, and she appropriately met postpartum milestones. She remained hemodynamically stable, afebrile and normotensive. The physical examination was unremarkable, her abdomen was appropriately tender. Her lochia was stable and decreasing. She was ambulating without difficulty, voiding without difficulty and passing flatus. Reports that she feels ready for baby at home, connected with social work, and has a good understanding of what to do next. Baby is admitted to R4, reports that she is ready for him to come home with her but has to get back home today to care for her 1 year old. Denies headaches, chest pain, shortness of breath, scotoma, or right upper quadrant pain.      PHYSICAL EXAM:  Constitutional: examination reveals a well developed, well nourished, female, in no acute distress. She is alert, pleasant and cooperative.  Cardiac: warm and well perfused, S1, S2, RRR  Respiratory:  Even and unlabored breathing in room air, clear to auscultation bilaterally. No crackles or wheezes.  Fundus: Firm and below umbilicus  Breast: No masses, or nipple discharge  Derm: Skin color, texture, turgor normal. No rashes, or lesions.    Abdominal: soft, appropriately-tender, non-distended, active bowel sounds x4, no masses, no organomegaly  Extremities: Symmetrical, no redness or tenderness in the calves or thighs, Trace BLE edema, no discoloration  Neurological: PERRLA. Alert, oriented, normal speech, no  focal findings or movement disorder noted.  Psychological: Appropriate mood and affect. Awake and alert; oriented to person, place, and time.   Skin: no rashes or lesions      Discharge Meds     Your medication list        START taking these medications        Instructions Last Dose Given Next Dose Due   ferrous sulfate (325 mg ferrous sulfate) tablet  Commonly known as: Iron (ferrous sulfate)      Take 1 tablet (325 mg) by mouth once daily in the morning. Take before meals. Take on an empty stomach with vitamin C supplement or vitamin C containing juice       polyethylene glycol 17 gram packet  Commonly known as: Miralax      Take 17 g by mouth 2 times a day for 14 days.       prenatal vitamin (iron-folic) 27 mg iron-800 mcg folic acid tablet      Take 1 tablet by mouth once daily.              CONTINUE taking these medications        Instructions Last Dose Given Next Dose Due   acetaminophen 500 mg tablet  Commonly known as: Tylenol      Take 2 tablets (1,000 mg) by mouth every 6 hours if needed for moderate pain (4 - 6).       Ventolin HFA 90 mcg/actuation inhaler  Generic drug: albuterol                  STOP taking these medications      NIFEdipine ER 90 mg 24 hr tablet  Commonly known as: Adalat CC                  Where to Get Your Medications        These medications were sent to [facility]  [address] [place] [address]      Hours: 8:30 AM to 5 PM Mon-Fri Phone: [phone]   ferrous sulfate (325 mg ferrous sulfate) tablet  polyethylene glycol 17 gram packet  prenatal vitamin (iron-folic) 27 mg iron-800 mcg folic acid tablet          Test Results Pending At Discharge  Pending Labs       Order Current Status    Surgical Pathology Exam - PLACENTA In process            Dispo:  The patient appropriate for discharge home, agrees with plan.     -  The signs and symptoms of PEC were reviewed with the patient, including unrelenting headache, vision changes/blurred vision, and RUQ pain  -   BP cuff for home for checking BP twice a day  -  Patient instructed to call primary OB if SBP > 160 or DBP > 110 or if development of PEC symptoms   - Follow up with primary OB in:       -  2-5 days for BP check       -  4-6 weeks for post-partum visit     Outpatient Follow-Up  No future appointments.   order placed to schedule 2-5 days for BP check and 4-6 weeks for routine postpartum visit  with [facility] providers. Also discussed importance of weekly PCN-G injections for two weeks to complete course for syphilis treatment.     I spent 40 minutes in the professional and overall care of this patient.      Eli Moreno, PHILLIP-JAMIR, CLC   12/08/24 1:01 PM  Vocera/secure chat

## 2024-12-08 NOTE — CARE PLAN
Problem: Postpartum  Goal: Appropriate maternal -  bonding  Outcome: Adequate for Discharge  Goal: Establish and maintain infant feeding pattern for adequate nutrition  Outcome: Adequate for Discharge  Goal: Incisions, wounds, or drain sites healing without S/S of infection  Outcome: Adequate for Discharge  Goal: No s/sx infection  Outcome: Adequate for Discharge  Goal: No s/sx of hemorrhage  Outcome: Adequate for Discharge  Goal: Minimal s/sx of HDP and BP<160/110  Outcome: Adequate for Discharge     Problem: Hypertensive Disorder of Pregnancy (HDP)  Goal: Minimal s/sx of HDP and BP<160/110  Outcome: Adequate for Discharge  Goal: Adequate urine output (0.5 ml/kg/hr)  Outcome: Adequate for Discharge     Problem: Pain - Adult  Goal: Verbalizes/displays adequate comfort level or baseline comfort level  Outcome: Adequate for Discharge     Problem: Safety - Adult  Goal: Free from fall injury  Outcome: Adequate for Discharge     Problem: Discharge Planning  Goal: Discharge to home or other facility with appropriate resources  Outcome: Adequate for Discharge    Throughout the course of the two days I've cared for Ms. Mathew her vitals have remained in normal ranges. Her pain has been controlled with oral medications as well as heat packs. The patient has been able to visit infant in the NICU multiple times. She has met discharge milestones and is cleared for discharge today.

## 2024-12-09 ENCOUNTER — PHARMACY VISIT (OUTPATIENT)
Dept: PHARMACY | Facility: CLINIC | Age: 29
End: 2024-12-09
Payer: MEDICAID

## 2024-12-10 ENCOUNTER — TELEPHONE (OUTPATIENT)
Dept: OBSTETRICS AND GYNECOLOGY | Facility: CLINIC | Age: 29
End: 2024-12-10
Payer: COMMERCIAL

## 2024-12-10 NOTE — TELEPHONE ENCOUNTER
----- Message from Vy LAW sent at 12/10/2024 11:34 AM EST -----  Regarding: FW: High Risk follow up  Good morning,    I spoke with pt she schedule but for the 13 th she states she don't know what time she will be here because her mom has to schedule transportation she told me to put 1 pm for appt but I explained if she can come anytime just in case but to call me to let me know. Pt stated ok.  Thanks  ----- Message -----  From: JUSTO Leggett RN  Sent: 12/10/2024  11:11 AM EST  To: Vy Stokeslivan  Subject: FW: High Risk follow up                          Please schedule with rn 12/13 and 12/20 nurse visits for bp check and syphillis treatment and a postpartum visit in 4-6 wks  ----- Message -----  From: KIMMY Tipton  Sent: 12/8/2024   2:19 PM EST  To: KIMMY Helms; Jose Jimenez MA; #  Subject: High Risk follow up                              Dear team,    I am discharging this patient from the postpartum service today and I am very worried about her follow up. She desired early discharge today because she did not have  another day for her 1 year old. Her pregnancy was complicated by schizophrenia, cHTN vs SiPEC, and syphilis. She also has unreliable transportation but needs to be seen with in 1 week for a BP check, and second dose of PCN-G IM. (She will need 2 more doses, received 1 dose while inpatient-I ordered them) and a routine postpartum visit with in 4-6 weeks. Nursing team can you please make sure that she is scheduled for these visits, and jose can you help with transportation to this appointments?    Lindy, can you please make sure that she isn't loss to follow up?    Appreciate all of your help!    Thank you!    KIMMY Lemons, CLC

## 2024-12-12 ENCOUNTER — TELEPHONE (OUTPATIENT)
Dept: OBSTETRICS AND GYNECOLOGY | Facility: HOSPITAL | Age: 29
End: 2024-12-12
Payer: COMMERCIAL

## 2024-12-12 NOTE — TELEPHONE ENCOUNTER
Called patient after canceled 12/13 appt found on chart review to see if this NP could assist with barriers that may be preventing her attendance.   No answer, left message for call back.   Patient called back shortly after, explained baby still in [facility] step[facility] unit had no transportation to [place].   Offered [facility] 1200 nurse visit, patient agreed, coordinated with office and patient to be scheduled for 12/13 at 2p.  Will continue to follow care and be available for coordination as needed.     Lindy Rosa, JAMIR  Complex/High Risk OB   [phone]      
no lacrimation L/no lacrimation R

## 2024-12-13 ENCOUNTER — APPOINTMENT (OUTPATIENT)
Dept: OBSTETRICS AND GYNECOLOGY | Facility: CLINIC | Age: 29
End: 2024-12-13
Payer: COMMERCIAL

## 2024-12-13 ENCOUNTER — APPOINTMENT (OUTPATIENT)
Dept: OBSTETRICS AND GYNECOLOGY | Facility: HOSPITAL | Age: 29
End: 2024-12-13
Payer: COMMERCIAL

## 2024-12-20 ENCOUNTER — APPOINTMENT (OUTPATIENT)
Dept: OBSTETRICS AND GYNECOLOGY | Facility: CLINIC | Age: 29
End: 2024-12-20
Payer: COMMERCIAL

## 2024-12-20 LAB
LAB AP ASR DISCLAIMER: NORMAL
LABORATORY COMMENT REPORT: NORMAL
PATH REPORT.ADDENDUM SPEC: NORMAL
PATH REPORT.COMMENTS IMP SPEC: NORMAL
PATH REPORT.FINAL DX SPEC: NORMAL
PATH REPORT.GROSS SPEC: NORMAL
PATH REPORT.RELEVANT HX SPEC: NORMAL
PATH REPORT.TOTAL CANCER: NORMAL

## 2025-01-01 DIAGNOSIS — O14.23 HEMOLYSIS, ELEVATED LIVER ENZYMES, AND LOW PLATELET (HELLP) SYNDROME DURING PREGNANCY IN THIRD TRIMESTER (HHS-HCC): ICD-10-CM

## 2025-01-01 DIAGNOSIS — O14.13 SEVERE PRE-ECLAMPSIA IN THIRD TRIMESTER (HHS-HCC): ICD-10-CM

## 2025-01-02 PROCEDURE — RXMED WILLOW AMBULATORY MEDICATION CHARGE

## 2025-01-06 ENCOUNTER — PHARMACY VISIT (OUTPATIENT)
Dept: PHARMACY | Facility: CLINIC | Age: 30
End: 2025-01-06
Payer: MEDICAID

## 2025-01-17 RX ORDER — FERROUS SULFATE 325(65) MG
325 TABLET ORAL
Qty: 30 TABLET | Refills: 0 | Status: SHIPPED | OUTPATIENT
Start: 2025-01-17 | End: 2025-02-16

## 2025-01-20 PROCEDURE — RXMED WILLOW AMBULATORY MEDICATION CHARGE

## 2025-01-21 ENCOUNTER — PHARMACY VISIT (OUTPATIENT)
Dept: PHARMACY | Facility: CLINIC | Age: 30
End: 2025-01-21
Payer: MEDICAID

## 2025-02-05 ENCOUNTER — DOCUMENTATION (OUTPATIENT)
Dept: OBSTETRICS AND GYNECOLOGY | Facility: HOSPITAL | Age: 30
End: 2025-02-05
Payer: COMMERCIAL

## 2025-02-05 NOTE — PROGRESS NOTES
Patient initially referred to complex/high Risk OB program for coordination of postpartum care.   Patient did not attended  appts but blood pressures stable and syphilis tx complete at outpatient hospital found on chart review.   Will no longer follow patient care but will be available for coordination if patient would like to re-establish with .     Lindy Rosa CNP  Complex/High Risk OB   [phone]